# Patient Record
Sex: MALE | Race: WHITE | NOT HISPANIC OR LATINO | URBAN - METROPOLITAN AREA
[De-identification: names, ages, dates, MRNs, and addresses within clinical notes are randomized per-mention and may not be internally consistent; named-entity substitution may affect disease eponyms.]

---

## 2018-12-14 ENCOUNTER — INPATIENT (INPATIENT)
Facility: HOSPITAL | Age: 43
LOS: 5 days | Discharge: HOME | DRG: 872 | End: 2018-12-20
Attending: INTERNAL MEDICINE | Admitting: INTERNAL MEDICINE
Payer: MEDICAID

## 2018-12-14 VITALS
SYSTOLIC BLOOD PRESSURE: 156 MMHG | HEART RATE: 98 BPM | DIASTOLIC BLOOD PRESSURE: 92 MMHG | OXYGEN SATURATION: 97 % | RESPIRATION RATE: 16 BRPM | TEMPERATURE: 99 F

## 2018-12-14 DIAGNOSIS — G03.9 MENINGITIS, UNSPECIFIED: ICD-10-CM

## 2018-12-14 LAB
ALBUMIN SERPL ELPH-MCNC: 4.2 G/DL — SIGNIFICANT CHANGE UP (ref 3.3–5)
ALP SERPL-CCNC: 80 U/L — SIGNIFICANT CHANGE UP (ref 40–120)
ALT FLD-CCNC: 104 U/L — HIGH (ref 10–45)
ANION GAP SERPL CALC-SCNC: 14 MMOL/L — SIGNIFICANT CHANGE UP (ref 5–17)
APPEARANCE CSF: ABNORMAL
APPEARANCE SPUN FLD: COLORLESS — SIGNIFICANT CHANGE UP
APPEARANCE UR: CLEAR — SIGNIFICANT CHANGE UP
AST SERPL-CCNC: 51 U/L — HIGH (ref 10–40)
BACTERIA # UR AUTO: NEGATIVE — SIGNIFICANT CHANGE UP
BASE EXCESS BLDV CALC-SCNC: 1.5 MMOL/L — SIGNIFICANT CHANGE UP (ref -2–2)
BILIRUB SERPL-MCNC: 0.5 MG/DL — SIGNIFICANT CHANGE UP (ref 0.2–1.2)
BILIRUB UR-MCNC: NEGATIVE — SIGNIFICANT CHANGE UP
BUN SERPL-MCNC: 15 MG/DL — SIGNIFICANT CHANGE UP (ref 7–23)
CA-I SERPL-SCNC: 1.18 MMOL/L — SIGNIFICANT CHANGE UP (ref 1.12–1.3)
CALCIUM SERPL-MCNC: 9.1 MG/DL — SIGNIFICANT CHANGE UP (ref 8.4–10.5)
CHLORIDE BLDV-SCNC: 101 MMOL/L — SIGNIFICANT CHANGE UP (ref 96–108)
CHLORIDE SERPL-SCNC: 96 MMOL/L — SIGNIFICANT CHANGE UP (ref 96–108)
CO2 BLDV-SCNC: 26 MMOL/L — SIGNIFICANT CHANGE UP (ref 22–30)
CO2 SERPL-SCNC: 22 MMOL/L — SIGNIFICANT CHANGE UP (ref 22–31)
COLOR CSF: SIGNIFICANT CHANGE UP
COLOR SPEC: YELLOW — SIGNIFICANT CHANGE UP
CREAT SERPL-MCNC: 1.03 MG/DL — SIGNIFICANT CHANGE UP (ref 0.5–1.3)
DIFF PNL FLD: NEGATIVE — SIGNIFICANT CHANGE UP
EPI CELLS # UR: 1 /HPF — SIGNIFICANT CHANGE UP
GAS PNL BLDV: 131 MMOL/L — LOW (ref 136–145)
GAS PNL BLDV: SIGNIFICANT CHANGE UP
GAS PNL BLDV: SIGNIFICANT CHANGE UP
GLUCOSE BLDV-MCNC: 116 MG/DL — HIGH (ref 70–99)
GLUCOSE CSF-MCNC: 38 MG/DL — LOW (ref 40–70)
GLUCOSE SERPL-MCNC: 117 MG/DL — HIGH (ref 70–99)
GLUCOSE UR QL: NEGATIVE — SIGNIFICANT CHANGE UP
HCO3 BLDV-SCNC: 25 MMOL/L — SIGNIFICANT CHANGE UP (ref 21–29)
HCT VFR BLD CALC: 39.7 % — SIGNIFICANT CHANGE UP (ref 39–50)
HCT VFR BLDA CALC: 41 % — SIGNIFICANT CHANGE UP (ref 39–50)
HGB BLD CALC-MCNC: 13.4 G/DL — SIGNIFICANT CHANGE UP (ref 13–17)
HGB BLD-MCNC: 13.5 G/DL — SIGNIFICANT CHANGE UP (ref 13–17)
HYALINE CASTS # UR AUTO: 0 /LPF — SIGNIFICANT CHANGE UP (ref 0–2)
KETONES UR-MCNC: SIGNIFICANT CHANGE UP
LACTATE BLDV-MCNC: 1.5 MMOL/L — SIGNIFICANT CHANGE UP (ref 0.7–2)
LEUKOCYTE ESTERASE UR-ACNC: NEGATIVE — SIGNIFICANT CHANGE UP
LYMPHOCYTES # CSF: 58 % — SIGNIFICANT CHANGE UP (ref 40–80)
MCHC RBC-ENTMCNC: 29.1 PG — SIGNIFICANT CHANGE UP (ref 27–34)
MCHC RBC-ENTMCNC: 34 GM/DL — SIGNIFICANT CHANGE UP (ref 32–36)
MCV RBC AUTO: 85.6 FL — SIGNIFICANT CHANGE UP (ref 80–100)
MONOS+MACROS NFR CSF: 17 % — SIGNIFICANT CHANGE UP (ref 15–45)
NEUTROPHILS # CSF: 25 % — HIGH (ref 0–6)
NITRITE UR-MCNC: NEGATIVE — SIGNIFICANT CHANGE UP
NRBC NFR CSF: 1460 /UL — HIGH (ref 0–5)
OTHER CELLS CSF MANUAL: 12 ML/DL — LOW (ref 18–22)
PCO2 BLDV: 36 MMHG — SIGNIFICANT CHANGE UP (ref 35–50)
PH BLDV: 7.46 — HIGH (ref 7.35–7.45)
PH UR: 7 — SIGNIFICANT CHANGE UP (ref 5–8)
PLATELET # BLD AUTO: 548 K/UL — HIGH (ref 150–400)
PO2 BLDV: 34 MMHG — SIGNIFICANT CHANGE UP (ref 25–45)
POTASSIUM BLDV-SCNC: 4.2 MMOL/L — SIGNIFICANT CHANGE UP (ref 3.5–5.3)
POTASSIUM SERPL-MCNC: 4.4 MMOL/L — SIGNIFICANT CHANGE UP (ref 3.5–5.3)
POTASSIUM SERPL-SCNC: 4.4 MMOL/L — SIGNIFICANT CHANGE UP (ref 3.5–5.3)
PROT CSF-MCNC: 120 MG/DL — HIGH (ref 15–45)
PROT SERPL-MCNC: 7.7 G/DL — SIGNIFICANT CHANGE UP (ref 6–8.3)
PROT UR-MCNC: ABNORMAL
RAPID RVP RESULT: SIGNIFICANT CHANGE UP
RBC # BLD: 4.64 M/UL — SIGNIFICANT CHANGE UP (ref 4.2–5.8)
RBC # CSF: 2 /UL — HIGH (ref 0–0)
RBC # FLD: 13 % — SIGNIFICANT CHANGE UP (ref 10.3–14.5)
RBC CASTS # UR COMP ASSIST: 1 /HPF — SIGNIFICANT CHANGE UP (ref 0–4)
SAO2 % BLDV: 67 % — SIGNIFICANT CHANGE UP (ref 67–88)
SODIUM SERPL-SCNC: 132 MMOL/L — LOW (ref 135–145)
SP GR SPEC: 1.02 — SIGNIFICANT CHANGE UP (ref 1.01–1.02)
TUBE TYPE: SIGNIFICANT CHANGE UP
UROBILINOGEN FLD QL: NEGATIVE — SIGNIFICANT CHANGE UP
WBC # BLD: 12.2 K/UL — HIGH (ref 3.8–10.5)
WBC # FLD AUTO: 12.2 K/UL — HIGH (ref 3.8–10.5)
WBC UR QL: 1 /HPF — SIGNIFICANT CHANGE UP (ref 0–5)

## 2018-12-14 RX ORDER — LIDOCAINE/EPINEPHR/TETRACAINE 4-0.09-0.5
1 GEL WITH PREFILLED APPLICATOR (ML) TOPICAL ONCE
Qty: 0 | Refills: 0 | Status: COMPLETED | OUTPATIENT
Start: 2018-12-14 | End: 2018-12-14

## 2018-12-14 RX ORDER — CEFTRIAXONE 500 MG/1
2 INJECTION, POWDER, FOR SOLUTION INTRAMUSCULAR; INTRAVENOUS ONCE
Qty: 0 | Refills: 0 | Status: COMPLETED | OUTPATIENT
Start: 2018-12-14 | End: 2018-12-14

## 2018-12-14 RX ORDER — SODIUM CHLORIDE 9 MG/ML
1500 INJECTION INTRAMUSCULAR; INTRAVENOUS; SUBCUTANEOUS ONCE
Qty: 0 | Refills: 0 | Status: COMPLETED | OUTPATIENT
Start: 2018-12-14 | End: 2018-12-14

## 2018-12-14 RX ORDER — VANCOMYCIN HCL 1 G
1000 VIAL (EA) INTRAVENOUS ONCE
Qty: 0 | Refills: 0 | Status: COMPLETED | OUTPATIENT
Start: 2018-12-14 | End: 2018-12-14

## 2018-12-14 RX ORDER — ONDANSETRON 8 MG/1
4 TABLET, FILM COATED ORAL ONCE
Qty: 0 | Refills: 0 | Status: COMPLETED | OUTPATIENT
Start: 2018-12-14 | End: 2018-12-14

## 2018-12-14 RX ORDER — ACETAMINOPHEN 500 MG
650 TABLET ORAL ONCE
Qty: 0 | Refills: 0 | Status: COMPLETED | OUTPATIENT
Start: 2018-12-14 | End: 2018-12-14

## 2018-12-14 RX ADMIN — Medication 650 MILLIGRAM(S): at 18:25

## 2018-12-14 RX ADMIN — Medication 1 APPLICATION(S): at 20:30

## 2018-12-14 RX ADMIN — ONDANSETRON 4 MILLIGRAM(S): 8 TABLET, FILM COATED ORAL at 23:58

## 2018-12-14 RX ADMIN — Medication 250 MILLIGRAM(S): at 22:28

## 2018-12-14 RX ADMIN — SODIUM CHLORIDE 1500 MILLILITER(S): 9 INJECTION INTRAMUSCULAR; INTRAVENOUS; SUBCUTANEOUS at 20:30

## 2018-12-14 RX ADMIN — CEFTRIAXONE 100 GRAM(S): 500 INJECTION, POWDER, FOR SOLUTION INTRAMUSCULAR; INTRAVENOUS at 19:47

## 2018-12-14 RX ADMIN — CEFTRIAXONE 2 GRAM(S): 500 INJECTION, POWDER, FOR SOLUTION INTRAMUSCULAR; INTRAVENOUS at 20:29

## 2018-12-14 RX ADMIN — SODIUM CHLORIDE 1500 MILLILITER(S): 9 INJECTION INTRAMUSCULAR; INTRAVENOUS; SUBCUTANEOUS at 18:21

## 2018-12-14 NOTE — ED ADULT NURSE NOTE - OBJECTIVE STATEMENT
43 yr old male amb to ed, denies PMH. C/o intermittent fever with ha x 3 weeks. Pt seen at [facility] , had ct and lab work. D/C home. Pt c/o feeling fatigue with headache. Has been taking Tylenol/ Motrin with only brief relief. C/o nausea and vomit x2 today. Denies abd pain Denies chest pain or sob Denies diarrhea Denies burn or diff urinating. No nuchal rigidity or sensitivity to light.

## 2018-12-14 NOTE — ED PROVIDER NOTE - OBJECTIVE STATEMENT
Private Physician [facility] staff  43y male pmh neg, no dm,htn,hld,cancercva, employed as dental tech. Pt comes to ed complains of fever/on off past three weeks, Tmax 102. With headache. Seen at [facility]/  guzman nl head ct and labs dc home. Now comes to ed referred by pmd for persistnet fatigue, headache. Has been taking tylenol/motrin with only brieft/occasional relief. +NV twice today. Ha biltemproal. and face, No chest pain cough,gi bleeding,diarrhea, dysuria, Last tylenol approx noon. No recent travel,cancer

## 2018-12-14 NOTE — ED PROVIDER NOTE - MEDICAL DECISION MAKING DETAILS
Pt presents with ha and fuo past three weeks, Check ct head, labs ua. cultures, if neg lp,  Brandin Cruz MD, Facep

## 2018-12-14 NOTE — ED ADULT NURSE NOTE - NSIMPLEMENTINTERV_GEN_ALL_ED
Implemented All Universal Safety Interventions:  [place] to call system. Call bell, personal items and telephone within reach. Instruct patient to call for assistance. Room bathroom lighting operational. Non-slip footwear when patient is off stretcher. Physically safe environment: no spills, clutter or unnecessary equipment. Stretcher in lowest position, wheels locked, appropriate side rails in place.

## 2018-12-15 DIAGNOSIS — E87.1 HYPO-OSMOLALITY AND HYPONATREMIA: ICD-10-CM

## 2018-12-15 DIAGNOSIS — Z29.9 ENCOUNTER FOR PROPHYLACTIC MEASURES, UNSPECIFIED: ICD-10-CM

## 2018-12-15 DIAGNOSIS — G03.9 MENINGITIS, UNSPECIFIED: ICD-10-CM

## 2018-12-15 DIAGNOSIS — A41.9 SEPSIS, UNSPECIFIED ORGANISM: ICD-10-CM

## 2018-12-15 DIAGNOSIS — G00.9 BACTERIAL MENINGITIS, UNSPECIFIED: ICD-10-CM

## 2018-12-15 DIAGNOSIS — R43.2 PARAGEUSIA: ICD-10-CM

## 2018-12-15 LAB
ANION GAP SERPL CALC-SCNC: 12 MMOL/L — SIGNIFICANT CHANGE UP (ref 5–17)
BASOPHILS # BLD AUTO: 0.02 K/UL — SIGNIFICANT CHANGE UP (ref 0–0.2)
BASOPHILS NFR BLD AUTO: 0.2 % — SIGNIFICANT CHANGE UP (ref 0–2)
BUN SERPL-MCNC: 16 MG/DL — SIGNIFICANT CHANGE UP (ref 7–23)
CALCIUM SERPL-MCNC: 8.9 MG/DL — SIGNIFICANT CHANGE UP (ref 8.4–10.5)
CHLORIDE SERPL-SCNC: 95 MMOL/L — LOW (ref 96–108)
CO2 SERPL-SCNC: 24 MMOL/L — SIGNIFICANT CHANGE UP (ref 22–31)
CREAT SERPL-MCNC: 1.02 MG/DL — SIGNIFICANT CHANGE UP (ref 0.5–1.3)
CSF PCR RESULT: SIGNIFICANT CHANGE UP
CULTURE RESULTS: NO GROWTH — SIGNIFICANT CHANGE UP
EOSINOPHIL # BLD AUTO: 0 K/UL — SIGNIFICANT CHANGE UP (ref 0–0.5)
EOSINOPHIL NFR BLD AUTO: 0 % — SIGNIFICANT CHANGE UP (ref 0–6)
GLUCOSE SERPL-MCNC: 105 MG/DL — HIGH (ref 70–99)
GRAM STN FLD: SIGNIFICANT CHANGE UP
HBV CORE AB SER-ACNC: SIGNIFICANT CHANGE UP
HCT VFR BLD CALC: 37.7 % — LOW (ref 39–50)
HCV AB S/CO SERPL IA: 0.09 S/CO — SIGNIFICANT CHANGE UP
HCV AB SERPL-IMP: SIGNIFICANT CHANGE UP
HGB BLD-MCNC: 12.5 G/DL — LOW (ref 13–17)
HIV 1+2 AB+HIV1 P24 AG SERPL QL IA: SIGNIFICANT CHANGE UP
IMM GRANULOCYTES NFR BLD AUTO: 0.2 % — SIGNIFICANT CHANGE UP (ref 0–1.5)
LYMPHOCYTES # BLD AUTO: 3.55 K/UL — HIGH (ref 1–3.3)
LYMPHOCYTES # BLD AUTO: 34.5 % — SIGNIFICANT CHANGE UP (ref 13–44)
MAGNESIUM SERPL-MCNC: 2.1 MG/DL — SIGNIFICANT CHANGE UP (ref 1.6–2.6)
MCHC RBC-ENTMCNC: 28.4 PG — SIGNIFICANT CHANGE UP (ref 27–34)
MCHC RBC-ENTMCNC: 33.2 GM/DL — SIGNIFICANT CHANGE UP (ref 32–36)
MCV RBC AUTO: 85.7 FL — SIGNIFICANT CHANGE UP (ref 80–100)
MONOCYTES # BLD AUTO: 1.09 K/UL — HIGH (ref 0–0.9)
MONOCYTES NFR BLD AUTO: 10.6 % — SIGNIFICANT CHANGE UP (ref 2–14)
NEUTROPHILS # BLD AUTO: 5.6 K/UL — SIGNIFICANT CHANGE UP (ref 1.8–7.4)
NEUTROPHILS NFR BLD AUTO: 54.5 % — SIGNIFICANT CHANGE UP (ref 43–77)
PHOSPHATE SERPL-MCNC: 2.9 MG/DL — SIGNIFICANT CHANGE UP (ref 2.5–4.5)
PLATELET # BLD AUTO: 456 K/UL — HIGH (ref 150–400)
POTASSIUM SERPL-MCNC: 4.5 MMOL/L — SIGNIFICANT CHANGE UP (ref 3.5–5.3)
POTASSIUM SERPL-SCNC: 4.5 MMOL/L — SIGNIFICANT CHANGE UP (ref 3.5–5.3)
RBC # BLD: 4.4 M/UL — SIGNIFICANT CHANGE UP (ref 4.2–5.8)
RBC # FLD: 13.7 % — SIGNIFICANT CHANGE UP (ref 10.3–14.5)
SODIUM SERPL-SCNC: 131 MMOL/L — LOW (ref 135–145)
SODIUM UR-SCNC: 135 MMOL/L — SIGNIFICANT CHANGE UP
SPECIMEN SOURCE: SIGNIFICANT CHANGE UP
SPECIMEN SOURCE: SIGNIFICANT CHANGE UP
WBC # BLD: 10.28 K/UL — SIGNIFICANT CHANGE UP (ref 3.8–10.5)
WBC # FLD AUTO: 10.28 K/UL — SIGNIFICANT CHANGE UP (ref 3.8–10.5)

## 2018-12-15 RX ORDER — ACETAMINOPHEN 500 MG
650 TABLET ORAL EVERY 6 HOURS
Qty: 0 | Refills: 0 | Status: DISCONTINUED | OUTPATIENT
Start: 2018-12-15 | End: 2018-12-20

## 2018-12-15 RX ORDER — VANCOMYCIN HCL 1 G
1000 VIAL (EA) INTRAVENOUS EVERY 8 HOURS
Qty: 0 | Refills: 0 | Status: DISCONTINUED | OUTPATIENT
Start: 2018-12-15 | End: 2018-12-18

## 2018-12-15 RX ORDER — ACETAMINOPHEN 500 MG
650 TABLET ORAL ONCE
Qty: 0 | Refills: 0 | Status: COMPLETED | OUTPATIENT
Start: 2018-12-15 | End: 2018-12-15

## 2018-12-15 RX ORDER — SODIUM CHLORIDE 9 MG/ML
1000 INJECTION INTRAMUSCULAR; INTRAVENOUS; SUBCUTANEOUS
Qty: 0 | Refills: 0 | Status: DISCONTINUED | OUTPATIENT
Start: 2018-12-15 | End: 2018-12-16

## 2018-12-15 RX ORDER — KETOROLAC TROMETHAMINE 30 MG/ML
15 SYRINGE (ML) INJECTION ONCE
Qty: 0 | Refills: 0 | Status: DISCONTINUED | OUTPATIENT
Start: 2018-12-15 | End: 2018-12-15

## 2018-12-15 RX ORDER — CEFTRIAXONE 500 MG/1
2 INJECTION, POWDER, FOR SOLUTION INTRAMUSCULAR; INTRAVENOUS EVERY 12 HOURS
Qty: 0 | Refills: 0 | Status: DISCONTINUED | OUTPATIENT
Start: 2018-12-15 | End: 2018-12-18

## 2018-12-15 RX ORDER — CEFTRIAXONE 500 MG/1
2 INJECTION, POWDER, FOR SOLUTION INTRAMUSCULAR; INTRAVENOUS EVERY 12 HOURS
Qty: 0 | Refills: 0 | Status: DISCONTINUED | OUTPATIENT
Start: 2018-12-15 | End: 2018-12-15

## 2018-12-15 RX ORDER — VANCOMYCIN HCL 1 G
1500 VIAL (EA) INTRAVENOUS EVERY 12 HOURS
Qty: 0 | Refills: 0 | Status: DISCONTINUED | OUTPATIENT
Start: 2018-12-15 | End: 2018-12-15

## 2018-12-15 RX ADMIN — Medication 650 MILLIGRAM(S): at 17:53

## 2018-12-15 RX ADMIN — CEFTRIAXONE 100 GRAM(S): 500 INJECTION, POWDER, FOR SOLUTION INTRAMUSCULAR; INTRAVENOUS at 06:34

## 2018-12-15 RX ADMIN — Medication 650 MILLIGRAM(S): at 00:19

## 2018-12-15 RX ADMIN — Medication 15 MILLIGRAM(S): at 12:44

## 2018-12-15 RX ADMIN — Medication 650 MILLIGRAM(S): at 12:44

## 2018-12-15 RX ADMIN — Medication 15 MILLIGRAM(S): at 23:00

## 2018-12-15 RX ADMIN — Medication 15 MILLIGRAM(S): at 22:29

## 2018-12-15 RX ADMIN — CEFTRIAXONE 100 GRAM(S): 500 INJECTION, POWDER, FOR SOLUTION INTRAMUSCULAR; INTRAVENOUS at 21:04

## 2018-12-15 RX ADMIN — Medication 650 MILLIGRAM(S): at 19:37

## 2018-12-15 RX ADMIN — SODIUM CHLORIDE 100 MILLILITER(S): 9 INJECTION INTRAMUSCULAR; INTRAVENOUS; SUBCUTANEOUS at 12:00

## 2018-12-15 RX ADMIN — Medication 650 MILLIGRAM(S): at 07:15

## 2018-12-15 RX ADMIN — Medication 250 MILLIGRAM(S): at 17:49

## 2018-12-15 RX ADMIN — Medication 300 MILLIGRAM(S): at 10:36

## 2018-12-15 RX ADMIN — Medication 650 MILLIGRAM(S): at 13:20

## 2018-12-15 RX ADMIN — SODIUM CHLORIDE 100 MILLILITER(S): 9 INJECTION INTRAMUSCULAR; INTRAVENOUS; SUBCUTANEOUS at 02:43

## 2018-12-15 RX ADMIN — Medication 650 MILLIGRAM(S): at 06:29

## 2018-12-15 RX ADMIN — Medication 650 MILLIGRAM(S): at 01:24

## 2018-12-15 NOTE — PROGRESS NOTE ADULT - PROBLEM SELECTOR PLAN 2
- leukocytosis (12.2), febrile to 103, HR >90, lactate wnl.  - likely source meningitis, CT chest negative, RVP negative, UA negative   - LP done in ED, CSF with glucose 98, Protein 120, Total Nucleated Cell 1460 with segmented neutrophils (25)  - BCx, UCx sent, s/p 1500cc IVF, c/w maintenance IVF   - will continue with vancomycin and ceftriaxone pending final cultures

## 2018-12-15 NOTE — H&P ADULT - NSHPPHYSICALEXAM_GEN_ALL_CORE
General: WN/WD NAD  Neurology: A&Ox3, nonfocal, CASILLAS x 4  Eyes: PERRLA/ EOMI, Gross vision intact  ENT: Gross hearing intact   Neck: Neck supple, trachea midline, No JVD  Respiratory: CTA B/L, No wheezing, rales, rhonchi  CV: RRR, S1S2, no murmurs, rubs or gallops  Abdominal: Soft, NT, ND +BS,   Extremities: No edema, + peripheral pulses  Skin: No Rashes, Hematoma, Ecchymosis Vital Signs Last 24 Hrs  T(C): 39.1 (15 Dec 2018 00:00), Max: 39.7 (14 Dec 2018 18:20)  T(F): 102.4 (15 Dec 2018 00:00), Max: 103.4 (14 Dec 2018 18:20)  HR: 80 (15 Dec 2018 00:00) (79 - 98)  BP: 135/61 (15 Dec 2018 00:00) (121/77 - 164/83)  RR: 16 (15 Dec 2018 00:00) (16 - 18)  SpO2: 95% (15 Dec 2018 00:00) (95% - 97%)    General: uncomfortable due to headache   Neurology: A&Ox3, nonfocal, CASILLAS x 4  Eyes: PERRLA/EOMI, Gross vision intact  ENT: Gross hearing intact   Neck: Neck supple, trachea midline, No JVD  Respiratory: CTA B/L, No wheezing, rales, rhonchi  CV: RRR, S1S2, no murmurs, rubs or gallops  Abdominal: Soft, NT, ND +BS,   Extremities: No edema, + peripheral pulses  Skin: No Rashes, Hematoma, Ecchymosis Vital Signs Last 24 Hrs  T(C): 39.1 (15 Dec 2018 00:00), Max: 39.7 (14 Dec 2018 18:20)  T(F): 102.4 (15 Dec 2018 00:00), Max: 103.4 (14 Dec 2018 18:20)  HR: 80 (15 Dec 2018 00:00) (79 - 98)  BP: 135/61 (15 Dec 2018 00:00) (121/77 - 164/83)  RR: 16 (15 Dec 2018 00:00) (16 - 18)  SpO2: 95% (15 Dec 2018 00:00) (95% - 97%)    General: uncomfortable due to headache, NAD, NC/AT  Neurology: A&Ox3, nonfocal, CASILLAS x 4  Eyes: PERRLA/EOMI, Gross vision intact  ENT: Gross hearing intact, MMM, nl dentition  Neck: Neck supple, trachea midline, No JVD  Respiratory: CTA B/L, No wheezing, rales, rhonchi  CV: RRR, S1S2, no murmurs, rubs or gallops  Abdominal: Soft, NT, ND +BS,   Extremities: No edema, + peripheral pulses  Skin: No Rashes, Hematoma, Ecchymosis

## 2018-12-15 NOTE — CONSULT NOTE ADULT - SUBJECTIVE AND OBJECTIVE BOX
HPI:  43M previously healthy was admitted 18 for meningitis after three weeks of headaches and fevers.   No inciting events, no sick contacts, lives at home with his wife and kids who are healthy, no travel recently, last trip was to his home country of [place] in the summer for one week.   The headaches was variable in quality and severity with associated neck discomfort and light and sound sensitivity. He reports going to [facility] last week with a negative CT head and blood work but refused an LP. Due to persistence of symptoms he saw his PMD on  who told him to come to the hospital.   CSF with elevated leukocyte 1460, slight neutrophil predominance, elevated protein 120, glucose slightly low 38. Gram stain negative and PCR negative. Had not received any antibiotics for these symptoms.       PAST MEDICAL & SURGICAL HISTORY:  No significant past medical history  No significant past surgical history      Allergies    No Known Allergies    Intolerances        ANTIMICROBIALS:  cefTRIAXone   IVPB 2 every 12 hours  vancomycin  IVPB 1500 every 12 hours      OTHER MEDS:  acetaminophen   Tablet .. 650 milliGRAM(s) Oral every 6 hours PRN  sodium chloride 0.9%. 1000 milliLiter(s) IV Continuous <Continuous>    SOCIAL HISTORY: Born in [place]. Employed. Lives with his wife and children.     FAMILY HISTORY:  Negative, No pertinent family history in relation to chief complaint     ROS:  Unobtainable because:   All other systems negative   Constitutional: no fever, no chills, no weight loss, no night sweats  HEENT:  no vision changes, no sore throat, no rhinorrhea  Cardiovascular:  no chest pain, no palpitation  Respiratory:  no SOB, no cough  GI:  no abd pain, no vomiting, no diarrhea  urinary: no dysuria, no hematuria, no flank pain  musculoskeletal:  no joint pain, no joint swelling  skin:  no rash  neurology:  no headache, no seizure, no change in mental status  heme: no bleeding    Physical Exam:  General:    NAD, non toxic, A&O x3  HEENT:   no oropharyngeal lesions, no LAD, neck supple  Cardiovascular:    regular rate and rhythm   Respiratory: nonlabored on room air, clear bilaterally, no wheezing  abd:   soft, bowel sounds present, not tender, no hepatosplenomegaly  :     no CVAT, no spurapubic tenderness, no cronin  Musculoskeletal : no joint swelling  Skin:    no rash  Neurologic:     no focal deficits  Vascular: no edema, no phlebitis   Psych: normal affect    Drug Dosing Weight  Height (cm): 187.96 (15 Dec 2018 01:50)  Weight (kg): 102.3 (15 Dec 2018 00:43)  BMI (kg/m2): 29 (15 Dec 2018 01:50)  BSA (m2): 2.29 (15 Dec 2018 01:50)    Vital Signs Last 24 Hrs  T(F): 99.6 (12-15-18 @ 09:14), Max: 103.4 (18 @ 18:20)    Vital Signs Last 24 Hrs  HR: 72 (12-15-18 @ 09:14) (71 - 98)  BP: 125/72 (12-15-18 @ 09:14) (112/65 - 164/83)  RR: 18 (12-15-18 @ 09:14)  SpO2: 95% (12-15-18 @ 09:14) (92% - 97%)  Wt(kg): --                          12.5   10.28 )-----------( 456      ( 15 Dec 2018 09:18 )             37.7       12-15    131<L>  |  95<L>  |  16  ----------------------------<  105<H>  4.5   |  24  |  1.02    Ca    8.9      15 Dec 2018 07:07  Phos  2.9     12-15  Mg     2.1     12-15    TPro  7.7  /  Alb  4.2  /  TBili  0.5  /  DBili  x   /  AST  51<H>  /  ALT  104<H>  /  AlkPhos  80        Urinalysis Basic - ( 14 Dec 2018 19:55 )    Color: Yellow / Appearance: Clear / S.024 / pH: x  Gluc: x / Ketone: Trace  / Bili: Negative / Urobili: Negative   Blood: x / Protein: Trace / Nitrite: Negative   Leuk Esterase: Negative / RBC: 1 /hpf / WBC 1 /hpf   Sq Epi: x / Non Sq Epi: 1 /hpf / Bacteria: Negative        MICROBIOLOGY:  v  .CSF CSF  12-15-18 --  --    polymorphonuclear leukocytes seen  No organisms seen  by cytocentrifuge          Rapid RVP Result: NotDetec ( @ 18:41)          RADIOLOGY: HPI:  43M previously healthy was admitted 18 for meningitis after three weeks of headaches and fevers.   No inciting events, no sick contacts, lives at home with his wife and kids who are healthy, no travel recently, last trip was to his home country of [place]"" in the summer for one week.   The headaches was variable in quality and severity with associated neck discomfort and light and sound sensitivity. He reports going to [facility] last week with a negative CT head and blood work but refused an LP. Due to persistence of symptoms he saw his PMD on  who told him to come to the hospital.   CSF with elevated leukocyte 1460, slight neutrophil predominance, elevated protein 120, glucose slightly low 38. Gram stain negative and PCR negative. Had not received any antibiotics for these symptoms.       PAST MEDICAL & SURGICAL HISTORY:  No significant past medical history  No significant past surgical history      Allergies    No Known Allergies    Intolerances        ANTIMICROBIALS:  cefTRIAXone   IVPB 2 every 12 hours  vancomycin  IVPB 1500 every 12 hours      OTHER MEDS:  acetaminophen   Tablet .. 650 milliGRAM(s) Oral every 6 hours PRN  sodium chloride 0.9%. 1000 milliLiter(s) IV Continuous <Continuous>    SOCIAL HISTORY: Born in \[place]". Employed. Lives with his wife and children.     FAMILY HISTORY:  Negative, No pertinent family history in relation to chief complaint     ROS:  All other systems negative   Constitutional: fever, chills, no appetite. malaise   HEENT:  no vision changes, no sore throat, no rhinorrhea  Cardiovascular:  no chest pain, no palpitation  Respiratory:  no SOB, no cough  GI:  no abd pain, no vomiting, no diarrhea  urinary: no dysuria, no hematuria, no flank pain  musculoskeletal:  no joint pain, no joint swelling  skin:  no rash  neurology:  headache, no change in mental status  heme: no bleeding    Physical Exam:  General:    uncomfortable, fatigued, A&O x3  HEENT:   no oropharyngeal lesions, no LAD, neck supple but uncomfortable on flexion  Cardiovascular:    regular rate and rhythm   Respiratory: nonlabored on room air, clear bilaterally, no wheezing  abd:   soft, bowel sounds present, not tender, no hepatosplenomegaly  :     no CVAT, no spurapubic tenderness, no cronin  Musculoskeletal : no joint swelling  Skin:    no rash  Neurologic:     no focal deficits  Vascular: no edema, no phlebitis   Psych: normal affect    Drug Dosing Weight  Height (cm): 187.96 (15 Dec 2018 01:50)  Weight (kg): 102.3 (15 Dec 2018 00:43)  BMI (kg/m2): 29 (15 Dec 2018 01:50)  BSA (m2): 2.29 (15 Dec 2018 01:50)    Vital Signs Last 24 Hrs  T(F): 99.6 (12-15-18 @ 09:14), Max: 103.4 (18 @ 18:20)    Vital Signs Last 24 Hrs  HR: 72 (12-15-18 @ 09:14) (71 - 98)  BP: 125/72 (12-15-18 @ 09:14) (112/65 - 164/83)  RR: 18 (12-15-18 @ 09:14)  SpO2: 95% (12-15-18 @ 09:14) (92% - 97%)  Wt(kg): --                          12.5   10.28 )-----------( 456      ( 15 Dec 2018 09:18 )             37.7       12-15    131<L>  |  95<L>  |  16  ----------------------------<  105<H>  4.5   |  24  |  1.02    Ca    8.9      15 Dec 2018 07:07  Phos  2.9     12-15  Mg     2.1     12-15    TPro  7.7  /  Alb  4.2  /  TBili  0.5  /  DBili  x   /  AST  51<H>  /  ALT  104<H>  /  AlkPhos  80  12-14      Urinalysis Basic - ( 14 Dec 2018 19:55 )    Color: Yellow / Appearance: Clear / S.024 / pH: x  Gluc: x / Ketone: Trace  / Bili: Negative / Urobili: Negative   Blood: x / Protein: Trace / Nitrite: Negative   Leuk Esterase: Negative / RBC: 1 /hpf / WBC 1 /hpf   Sq Epi: x / Non Sq Epi: 1 /hpf / Bacteria: Negative        MICROBIOLOGY:  v  .CSF CSF  12-15-18 --  --    polymorphonuclear leukocytes seen  No organisms seen  by cytocentrifuge          Rapid RVP Result: NotDetec ( @ 18:41)          RADIOLOGY:

## 2018-12-15 NOTE — H&P ADULT - HISTORY OF PRESENT ILLNESS
43M with no significant PMHx who presents with fevers and headaches of 2 week duration. Pt. has been having persistent fevers at home with Tmax of 102F associated with headache and neck stiffness. Headache is bilateral, and has been constant over the past two weeks, minimal relief with tylenol. He went to [facility] on 12/4 with these complaints, CTH negative, was recommended to get LP, however refused and went home. He continued to have fatigue and headaches, so was suggested by his PMD to come to the ED today. Hhe denies  cp, sob, n/v/d, dysuria, rashes, sick contacts, recent travel.     In the ED - VS Tm 103.4, HR 98, /92, RR 16, SPO2 97% on RA  Labs notable for leukocytosis (WBC 12.2)    LP done in ED, CSF with glucose 98, Protein 120, Total Nucleated Cell 1460 with segmented neutrophils (25)    CTH and chest negative     Received, vancomycin 1g, ceftriaxone 2g, 1500cc NS bolus, acetaminophen x2, ondansetron 4mg x1 43M with no significant PMHx who presents with fevers and headaches of 2 week duration. Pt. has been having persistent fevers at home with Tmax of 102F associated with headache and neck stiffness. Headache is bilateral, and has been constant over the past two weeks, minimal relief with tylenol. He went to [facility] on 12/4 with these complaints, CTH negative, was recommended to get LP, however refused and went home. He continued to have fatigue and headaches, so was suggested by his PMD to come to the ED today. Hhe denies  cp, sob, n/v/d, dysuria, rashes, sick contacts, recent travel.     In the ED - VS Tm 103.4, HR 98, /92, RR 16, SPO2 97% on RA  Labs notable for leukocytosis (WBC 12.2)  LP done in ED, CSF with glucose 98, Protein 120, Total Nucleated Cell 1460 with segmented neutrophils (25)  CTH and chest negative     Received, vancomycin 1g, ceftriaxone 2g, 1500cc NS bolus, acetaminophen x2, ondansetron 4mg x1 43M with no significant PMHx who presents with fevers and headaches of 2 week duration. Pt. has been having persistent fevers at home with Tmax of 102F associated with headache and neck stiffness. Headache is bilateral, and has been constant over the past two weeks, minimal relief with tylenol. He went to [facility] on 12/4 with these complaints, CTH negative, was recommended to get LP, however refused and went home. He continued to have fatigue and headaches, so was suggested by his PMD to come to the ED today. He denies hx of latent tb, cp, sob, n/v/d, dysuria, rashes, sick contacts, recent travel.     In the ED - VS Tm 103.4, HR 98, /92, RR 16, SPO2 97% on RA  Labs notable for leukocytosis (WBC 12.2)  LP done in ED, CSF with glucose 98, Protein 120, Total Nucleated Cell 1460 with segmented neutrophils (25)  CTH and chest negative     Received, vancomycin 1g, ceftriaxone 2g, 1500cc NS bolus, acetaminophen x2, ondansetron 4mg x1 43M with no significant PMHx who presents with fevers and headaches of 2 week duration. Pt. has been having persistent fevers at home with Tmax of 102F associated with headache and neck stiffness. Headache is bilateral, and has been constant over the past two weeks, minimal relief with tylenol. He went to [facility] on 12/4 with these complaints, CTH negative, was recommended to get LP, however refused and went home. He continued to have fatigue and headaches, so was suggested by his PMD to come to the ED today. He denies hx of latent tb, cp, sob, n/v/d, dysuria, rashes, sick contacts, recent travel.  [place] moved [place] from [place] at 23 yo and visits frequently last this summer.  No h/o drug use/group home.  No h/o frequent infections/pna/past meningitis.  Pt states 3 yrs ago he had an "illness" and lost his sense of taste and smell - both slowly returning but things will smell different than they used to, has had neg work up by MD.    In the ED - VS Tm 103.4, HR 98, /92, RR 16, SPO2 97% on RA  Labs notable for leukocytosis (WBC 12.2)  LP done in ED, CSF with glucose 98, Protein 120, Total Nucleated Cell 1460 with segmented neutrophils (25)  CTH and chest negative     Received, vancomycin 1g, ceftriaxone 2g, 1500cc NS bolus, acetaminophen x2, ondansetron 4mg x1

## 2018-12-15 NOTE — CONSULT NOTE ADULT - ASSESSMENT
Not sure etiology. Studies suggest bacterial although not convincingly. Would not expect false negative PCR with this high of pleocytosis and without prior antibiotics although sensitivity for all pathogens not clear.   Canc ontinue empiric Vanc Ceftriaxone   No risk for Listeria   HIV screen- patient agrees   repeat PCR?   f/u cultures   Acyclovir? no encephalitis Not sure etiology. Studies suggest bacterial although not convincingly. Would not expect false negative PCR with this high of pleocytosis and without prior antibiotics although sensitivity for all pathogens not clear.   Canc ontinue empiric Vanc Ceftriaxone   Vanc 1 q8h probably better dosing   No risk for Listeria   HIV screen- patient agrees   repeat PCR?   f/u cultures   Acyclovir? no encephalitis 43M previously healthy admitted 12/14/18 with meningitis, symptoms for three weeks.   Unclear etiology. CSF studies favor bacterial but not convincingly. PCR sensitivity for HSV and Meningococcal are high 96-98%, for other pathogens not clear but with this high of pleocytosis and without prior antibiotics would expect it to have picked up.     Recommend  -can continue empiric Vancomycin (1GM IV q8h) and Ceftriaxone   -droplet precautions can stop after 24hrs of receiving Ceftriaxone   -HIV screen- patient agrees   -HIV viral load   -LDH   -Quantiferon gold   -repeat LP in a few days if not improved for flow cytometry, large volume AFB culture and cell count, protein, glucose     attempted to call back primary team

## 2018-12-15 NOTE — PROGRESS NOTE ADULT - SUBJECTIVE AND OBJECTIVE BOX
Froylan Patricia, PGY1  Pager: [phone]  After 7: Night Float pager  Alternate contact:     Patient is a 43y old  Male who presents with a chief complaint of meningitis (15 Dec 2018 00:11)      SUBJECTIVE / OVERNIGHT EVENTS:    MEDICATIONS  (STANDING):  cefTRIAXone   IVPB 2 Gram(s) IV Intermittent every 12 hours  sodium chloride 0.9%. 1000 milliLiter(s) (100 mL/Hr) IV Continuous <Continuous>  vancomycin  IVPB 1500 milliGRAM(s) IV Intermittent every 12 hours    MEDICATIONS  (PRN):  acetaminophen   Tablet .. 650 milliGRAM(s) Oral every 6 hours PRN Temp greater or equal to 38C (100.4F)      Vital Signs Last 24 Hrs  T(C): 39.4 (15 Dec 2018 06:40), Max: 39.7 (14 Dec 2018 18:20)  T(F): 102.9 (15 Dec 2018 06:40), Max: 103.4 (14 Dec 2018 18:20)  HR: 76 (15 Dec 2018 06:40) (71 - 98)  BP: 121/73 (15 Dec 2018 06:40) (112/65 - 164/83)  BP(mean): --  RR: 20 (15 Dec 2018 06:40) (16 - 20)  SpO2: 94% (15 Dec 2018 06:40) (92% - 97%)  CAPILLARY BLOOD GLUCOSE        I&O's Summary    14 Dec 2018 07:01  -  15 Dec 2018 07:00  --------------------------------------------------------  IN: 550 mL / OUT: 0 mL / NET: 550 mL        PHYSICAL EXAM:  GENERAL: NAD, well-developed  EYES: EOMI, PERRLA, conjunctiva and sclera clear  NECK:  No JVD  CHEST/LUNG: CTABL ; No wheeze  HEART: RRR; No murmurs  ABDOMEN: Soft, Nontender, Nondistended; Bowel sounds present  EXTREMITIES:  2+ Peripheral Pulses, No edema  MUSCULOSKEL:   PSYCH: AAOx   NEUROLOGY: CN 2-12 grossly intact, strength, sensory,   SKIN: No rashes or lesions. No sacral ulcer    LABS:                        13.5   12.2  )-----------( 548      ( 14 Dec 2018 18:41 )             39.7     12-    132<L>  |  96  |  15  ----------------------------<  117<H>  4.4   |  22  |  1.03    Ca    9.1      14 Dec 2018 18:41    TPro  7.7  /  Alb  4.2  /  TBili  0.5  /  DBili  x   /  AST  51<H>  /  ALT  104<H>  /  AlkPhos  80            Urinalysis Basic - ( 14 Dec 2018 19:55 )    Color: Yellow / Appearance: Clear / S.024 / pH: x  Gluc: x / Ketone: Trace  / Bili: Negative / Urobili: Negative   Blood: x / Protein: Trace / Nitrite: Negative   Leuk Esterase: Negative / RBC: 1 /hpf / WBC 1 /hpf   Sq Epi: x / Non Sq Epi: 1 /hpf / Bacteria: Negative        Microbiology;    Culture - CSF with Gram Stain (collected 12-15-18 @ 00:20)  Source: .CSF CSF  Gram Stain (12-15-18 @ 01:31):    polymorphonuclear leukocytes seen    No organisms seen    by cytocentrifuge        RADIOLOGY & ADDITIONAL TESTS:    Imaging Personally Reviewed:    Consultant(s) Notes Reviewed: Froylan Patricia, PGY1  Pager: [phone]  After 7: Night Float pager  Alternate contact:     Patient is a 43y old  Male who presents with a chief complaint of meningitis (15 Dec 2018 00:11)    SUBJECTIVE / OVERNIGHT EVENTS: Spiked fever of 102.9F in AM, fever resolved w/ Tylenol.  Endorses fever, sweat, stiff neck, dull headache, photophobia. He is mentating well, A&Ox3, denies SOB, chest pain, palpitation, n/v, and GI changes.     MEDICATIONS  (STANDING):  cefTRIAXone   IVPB 2 Gram(s) IV Intermittent every 12 hours  sodium chloride 0.9%. 1000 milliLiter(s) (100 mL/Hr) IV Continuous <Continuous>  vancomycin  IVPB 1500 milliGRAM(s) IV Intermittent every 12 hours    MEDICATIONS  (PRN):  acetaminophen   Tablet .. 650 milliGRAM(s) Oral every 6 hours PRN Temp greater or equal to 38C (100.4F)      Vital Signs Last 24 Hrs  T(C): 39.4 (15 Dec 2018 06:40), Max: 39.7 (14 Dec 2018 18:20)  T(F): 102.9 (15 Dec 2018 06:40), Max: 103.4 (14 Dec 2018 18:20)  HR: 76 (15 Dec 2018 06:40) (71 - 98)  BP: 121/73 (15 Dec 2018 06:40) (112/65 - 164/83)  BP(mean): --  RR: 20 (15 Dec 2018 06:40) (16 - 20)  SpO2: 94% (15 Dec 2018 06:40) (92% - 97%)  CAPILLARY BLOOD GLUCOSE    I&O's Summary    14 Dec 2018 07:01  -  15 Dec 2018 07:00  --------------------------------------------------------  IN: 550 mL / OUT: 0 mL / NET: 550 mL      PHYSICAL EXAM:  General: uncomfortable due to headache, NAD, NC/AT  Neurology: A&Ox3, nonfocal, CASILLAS x 4  Eyes: PERRLA/EOMI, Gross vision intact  ENT: Gross hearing intact, MMM, nl dentition  Neck: Neck supple, trachea midline, No JVD  Respiratory: CTA B/L, No wheezing, rales, rhonchi  CV: RRR, S1S2, no murmurs, rubs or gallops  Abdominal: Soft, NT, ND +BS,   Extremities: No edema, + peripheral pulses  Skin: No Rashes, Hematoma, Ecchymosis    LABS:                        13.5   12.2  )-----------( 548      ( 14 Dec 2018 18:41 )             39.7     1214    132<L>  |  96  |  15  ----------------------------<  117<H>  4.4   |  22  |  1.03    Ca    9.1      14 Dec 2018 18:41    TPro  7.7  /  Alb  4.2  /  TBili  0.5  /  DBili  x   /  AST  51<H>  /  ALT  104<H>  /  AlkPhos  80  12-      Urinalysis Basic - ( 14 Dec 2018 19:55 )    Color: Yellow / Appearance: Clear / S.024 / pH: x  Gluc: x / Ketone: Trace  / Bili: Negative / Urobili: Negative   Blood: x / Protein: Trace / Nitrite: Negative   Leuk Esterase: Negative / RBC: 1 /hpf / WBC 1 /hpf   Sq Epi: x / Non Sq Epi: 1 /hpf / Bacteria: Negative      Microbiology;    Culture - CSF with Gram Stain (collected 12-15-18 @ 00:20)  Source: .CSF CSF  Gram Stain (12-15-18 @ 01:31):    polymorphonuclear leukocytes seen    No organisms seen    by cytocentrifuge

## 2018-12-15 NOTE — H&P ADULT - NSHPLABSRESULTS_GEN_ALL_CORE
13.5   12.2  )-----------( 548      ( 14 Dec 2018 18:41 )             39.7     12-14    132<L>  |  96  |  15  ----------------------------<  117<H>  4.4   |  22  |  1.03    Ca    9.1      14 Dec 2018 18:41    TPro  7.7  /  Alb  4.2  /  TBili  0.5  /  DBili  x   /  AST  51<H>  /  ALT  104<H>  /  AlkPhos  80  12-14    EKG - 13.5   12.2  )-----------( 548      ( 14 Dec 2018 18:41 )             39.7     12-14    132<L>  |  96  |  15  ----------------------------<  117<H>  4.4   |  22  |  1.03    Ca    9.1      14 Dec 2018 18:41    TPro  7.7  /  Alb  4.2  /  TBili  0.5  /  DBili  x   /  AST  51<H>  /  ALT  104<H>  /  AlkPhos  80  12-14    EKG - not in chart, will order    < from: CT Chest No Cont (12.14.18 @ 19:42) >    IMPRESSION: Bilateral subsegmental atelectasis. No pneumonia.    < end of copied text >    < from: CT Head No Cont (12.14.18 @ 18:51) >    IMPRESSION:    Normal non-contrast CT of the brain.    < end of copied text > 13.5   12.2  )-----------( 548      ( 14 Dec 2018 18:41 )             39.7     12-14    132<L>  |  96  |  15  ----------------------------<  117<H>  4.4   |  22  |  1.03    Ca    9.1      14 Dec 2018 18:41    TPro  7.7  /  Alb  4.2  /  TBili  0.5  /  DBili  x   /  AST  51<H>  /  ALT  104<H>  /  AlkPhos  80  12-14    EKG - not in chart, will order    < from: CT Chest No Cont (12.14.18 @ 19:42) >    IMPRESSION: Bilateral subsegmental atelectasis. No pneumonia.    < end of copied text >    < from: CT Head No Cont (12.14.18 @ 18:51) >    IMPRESSION:    Normal non-contrast CT of the brain.    < end of copied text >  labs/studied reviewed personally by me

## 2018-12-15 NOTE — H&P ADULT - PROBLEM SELECTOR PLAN 2
- leukocytosis (13), febrile to 103, HR >90, lactate wnl.  - likely source bacterial meningitis, CT chest negative, RVP negative, UA negative   - LP done in ED, CSF with glucose 98, Protein 120, Total Nucleated Cell 1460 with segmented neutrophils (25)  - BCx, UCx sent   - will continue with vancomycin and ceftriaxone pending final cultures - leukocytosis (12.2), febrile to 103, HR >90, lactate wnl.  - likely source bacterial meningitis, CT chest negative, RVP negative, UA negative   - LP done in ED, CSF with glucose 98, Protein 120, Total Nucleated Cell 1460 with segmented neutrophils (25)  - BCx, UCx sent, s/p 1500cc IVF, c/w maintenance IVF   - will continue with vancomycin and ceftriaxone pending final cultures - leukocytosis (12.2), febrile to 103, HR >90, lactate wnl.  - likely source meningitis, CT chest negative, RVP negative, UA negative   - LP done in ED, CSF with glucose 98, Protein 120, Total Nucleated Cell 1460 with segmented neutrophils (25)  - BCx, UCx sent, s/p 1500cc IVF, c/w maintenance IVF   - will continue with vancomycin and ceftriaxone pending final cultures

## 2018-12-15 NOTE — ED ADULT NURSE REASSESSMENT NOTE - NS ED NURSE REASSESS COMMENT FT1
As per doctor Juan Daniel patient is to get Vancomycin 12 hours after getting first dose of Vancomycin. Dose is rescheduled in MAR.

## 2018-12-15 NOTE — PROGRESS NOTE ADULT - PROBLEM SELECTOR PLAN 3
- Na+ 131 in AM, will obtain urine studies, hyponatremia could be 2/2 to SIADH    - c/w NS @ 100mL/hr   - continue trend BMP

## 2018-12-15 NOTE — H&P ADULT - NSHPREVIEWOFSYSTEMS_GEN_ALL_CORE
REVIEW OF SYSTEMS:  CONSTITUTIONAL: +weakness, +fevers or chills  EYES: no visual changes  ENT: No vertigo or throat pain   NECK: +pain or stiffness  RESPIRATORY: No cough, wheezing, hemoptysis; No shortness of breath  CARDIOVASCULAR: No chest pain or palpitations  GASTROINTESTINAL: No abdominal or epigastric pain. No nausea, vomiting, or hematemesis; No diarrhea or constipation. No melena or hematochezia.  GENITOURINARY: No dysuria, frequency or hematuria  NEUROLOGICAL: No numbness or weakness  SKIN: No itching, rashes REVIEW OF SYSTEMS:  CONSTITUTIONAL: +weakness, +fevers or chills  EYES: no visual changes  ENT: No vertigo or throat pain, poor taste and smell  NECK: +pain or stiffness  RESPIRATORY: No cough, wheezing, hemoptysis; No shortness of breath  CARDIOVASCULAR: No chest pain or palpitations  GASTROINTESTINAL: No abdominal or epigastric pain. No nausea, vomiting, or hematemesis; No diarrhea or constipation. No melena or hematochezia.  GENITOURINARY: No dysuria, frequency or hematuria  NEUROLOGICAL: No numbness or weakness  SKIN: No itching, rashes

## 2018-12-15 NOTE — H&P ADULT - PROBLEM SELECTOR PLAN 5
unclear etiology  f/u with PMD for past work up  likely no relation to current neurologic condition with meningitis

## 2018-12-15 NOTE — H&P ADULT - ASSESSMENT
43M with no significant PMHx who presents with fevers and headaches of 2 week duration likely 2/2 bacterial meningitis. 43M with no significant PMHx who presents with fevers and headaches of 2 week duration likely 2/2 meningitis.

## 2018-12-15 NOTE — H&P ADULT - PROBLEM SELECTOR PLAN 1
- headache + fever of 2 week duration, CTH negative   - CSF with glucose 98, Protein 120, Total Nucleated Cell 1460 with segmented neutrophils (25) consistent with bacterial meningitis   - continue with vancomycin 1500g BID, ceftriaxone 2g BID, pt. received abx prior to steroids, no indication to initiate dexamethasone at this time - headache + fever of 2 week duration, CTH negative   - CSF with glucose 98, Protein 120, Total Nucleated Cell 1460 with segmented neutrophils (25) possibly 2/2 bacterial meningitis, however pt. has had symptoms for two weeks and lower neutrophil count than expectd  - continue with vancomycin 1500g BID, ceftriaxone 2g BID, pt. received abx prior to steroids, no indication to initiate dexamethasone at this time - headache + fever of 2 week duration, CTH negative   - CSF with glucose 98, Protein 120, Total Nucleated Cell 1460 with segmented neutrophils (25) possibly 2/2 bacterial meningitis, however pt. has had symptoms for two weeks and lower neutrophil count than expected, f/u CSF PCR, AFB culture not sent - will try to add on   - continue with vancomycin 1500mg BID, ceftriaxone 2g BID, pt. received abx prior to steroids, no indication to initiate dexamethasone at this time  - Hep B/C antibodies, HIV screen, ID consult in AM - headache + fever of 2 week duration, CTH negative   - CSF with glucose 98, Protein 120, Total Nucleated Cell 1460 with segmented neutrophils (25) possibly 2/2 bacterial meningitis, however pt. has had symptoms for two weeks and lower neutrophil count than expected, f/u CSF PCR, AFB culture not sent - added on AFB   - continue with vancomycin 1500mg BID, ceftriaxone 2g BID, pt. received abx prior to steroids, no indication to initiate dexamethasone at this time  - Hep B/C antibodies, HIV screen, ID consult in AM

## 2018-12-15 NOTE — H&P ADULT - PROBLEM SELECTOR PLAN 4
repeat labs in am and if still low would check serum and urine osm ? siadh with meningitis vs dehydration

## 2018-12-15 NOTE — PROGRESS NOTE ADULT - PROBLEM SELECTOR PLAN 1
- headache + fever of 2 week duration, CTH negative   - CSF with glucose 98, Protein 120, Total Nucleated Cell 1460 with segmented neutrophils (25) possibly 2/2 bacterial meningitis, however pt. has had symptoms for two weeks and lower neutrophil count than expected, f/u CSF PCR, AFB culture  - continue with vancomycin 1500mg BID, ceftriaxone 2g BID, pt. received abx prior to steroids, no indication to initiate dexamethasone at this time  - f/u Hep B/C antibodies, HIV results   -f/u BCx and UCx   - pending ID consult

## 2018-12-15 NOTE — PROGRESS NOTE ADULT - ASSESSMENT
43M with no significant PMHx who presents with fevers and headaches of 2 week duration, s/p LP in the ED, likely 2/2 bacterial meningitis. Currently hemodynamically stable, pending further ID eval

## 2018-12-16 LAB
ALBUMIN SERPL ELPH-MCNC: 3.7 G/DL — SIGNIFICANT CHANGE UP (ref 3.3–5)
ALP SERPL-CCNC: 68 U/L — SIGNIFICANT CHANGE UP (ref 40–120)
ALT FLD-CCNC: 65 U/L — HIGH (ref 10–45)
ANION GAP SERPL CALC-SCNC: 14 MMOL/L — SIGNIFICANT CHANGE UP (ref 5–17)
AST SERPL-CCNC: 21 U/L — SIGNIFICANT CHANGE UP (ref 10–40)
BILIRUB SERPL-MCNC: 0.4 MG/DL — SIGNIFICANT CHANGE UP (ref 0.2–1.2)
BUN SERPL-MCNC: 16 MG/DL — SIGNIFICANT CHANGE UP (ref 7–23)
CALCIUM SERPL-MCNC: 8.5 MG/DL — SIGNIFICANT CHANGE UP (ref 8.4–10.5)
CHLORIDE SERPL-SCNC: 94 MMOL/L — LOW (ref 96–108)
CO2 SERPL-SCNC: 23 MMOL/L — SIGNIFICANT CHANGE UP (ref 22–31)
CREAT SERPL-MCNC: 1.01 MG/DL — SIGNIFICANT CHANGE UP (ref 0.5–1.3)
GLUCOSE SERPL-MCNC: 107 MG/DL — HIGH (ref 70–99)
HCT VFR BLD CALC: 37.3 % — LOW (ref 39–50)
HGB BLD-MCNC: 12.6 G/DL — LOW (ref 13–17)
LDH SERPL L TO P-CCNC: 184 U/L — SIGNIFICANT CHANGE UP (ref 50–242)
MAGNESIUM SERPL-MCNC: 2.1 MG/DL — SIGNIFICANT CHANGE UP (ref 1.6–2.6)
MCHC RBC-ENTMCNC: 28.2 PG — SIGNIFICANT CHANGE UP (ref 27–34)
MCHC RBC-ENTMCNC: 33.8 GM/DL — SIGNIFICANT CHANGE UP (ref 32–36)
MCV RBC AUTO: 83.4 FL — SIGNIFICANT CHANGE UP (ref 80–100)
PHOSPHATE SERPL-MCNC: 1.9 MG/DL — LOW (ref 2.5–4.5)
PLATELET # BLD AUTO: 422 K/UL — HIGH (ref 150–400)
POTASSIUM SERPL-MCNC: 3.8 MMOL/L — SIGNIFICANT CHANGE UP (ref 3.5–5.3)
POTASSIUM SERPL-SCNC: 3.8 MMOL/L — SIGNIFICANT CHANGE UP (ref 3.5–5.3)
PROT SERPL-MCNC: 7 G/DL — SIGNIFICANT CHANGE UP (ref 6–8.3)
RBC # BLD: 4.47 M/UL — SIGNIFICANT CHANGE UP (ref 4.2–5.8)
RBC # FLD: 13.3 % — SIGNIFICANT CHANGE UP (ref 10.3–14.5)
SODIUM SERPL-SCNC: 131 MMOL/L — LOW (ref 135–145)
VANCOMYCIN TROUGH SERPL-MCNC: 10.3 UG/ML — SIGNIFICANT CHANGE UP (ref 10–20)
WBC # BLD: 7.6 K/UL — SIGNIFICANT CHANGE UP (ref 3.8–10.5)
WBC # FLD AUTO: 7.6 K/UL — SIGNIFICANT CHANGE UP (ref 3.8–10.5)

## 2018-12-16 RX ORDER — KETOROLAC TROMETHAMINE 30 MG/ML
15 SYRINGE (ML) INJECTION ONCE
Qty: 0 | Refills: 0 | Status: DISCONTINUED | OUTPATIENT
Start: 2018-12-16 | End: 2018-12-16

## 2018-12-16 RX ORDER — ONDANSETRON 8 MG/1
4 TABLET, FILM COATED ORAL ONCE
Qty: 0 | Refills: 0 | Status: COMPLETED | OUTPATIENT
Start: 2018-12-16 | End: 2018-12-16

## 2018-12-16 RX ORDER — KETOROLAC TROMETHAMINE 30 MG/ML
15 SYRINGE (ML) INJECTION EVERY 8 HOURS
Qty: 0 | Refills: 0 | Status: DISCONTINUED | OUTPATIENT
Start: 2018-12-16 | End: 2018-12-17

## 2018-12-16 RX ORDER — SODIUM CHLORIDE 9 MG/ML
1000 INJECTION INTRAMUSCULAR; INTRAVENOUS; SUBCUTANEOUS
Qty: 0 | Refills: 0 | Status: DISCONTINUED | OUTPATIENT
Start: 2018-12-16 | End: 2018-12-20

## 2018-12-16 RX ORDER — ONDANSETRON 8 MG/1
4 TABLET, FILM COATED ORAL ONCE
Qty: 0 | Refills: 0 | Status: DISCONTINUED | OUTPATIENT
Start: 2018-12-16 | End: 2018-12-16

## 2018-12-16 RX ADMIN — Medication 15 MILLIGRAM(S): at 10:00

## 2018-12-16 RX ADMIN — CEFTRIAXONE 100 GRAM(S): 500 INJECTION, POWDER, FOR SOLUTION INTRAMUSCULAR; INTRAVENOUS at 06:37

## 2018-12-16 RX ADMIN — Medication 250 MILLIGRAM(S): at 02:12

## 2018-12-16 RX ADMIN — CEFTRIAXONE 100 GRAM(S): 500 INJECTION, POWDER, FOR SOLUTION INTRAMUSCULAR; INTRAVENOUS at 18:56

## 2018-12-16 RX ADMIN — SODIUM CHLORIDE 100 MILLILITER(S): 9 INJECTION INTRAMUSCULAR; INTRAVENOUS; SUBCUTANEOUS at 12:01

## 2018-12-16 RX ADMIN — Medication 62.5 MILLIMOLE(S): at 10:13

## 2018-12-16 RX ADMIN — Medication 250 MILLIGRAM(S): at 09:12

## 2018-12-16 RX ADMIN — Medication 15 MILLIGRAM(S): at 09:25

## 2018-12-16 RX ADMIN — ONDANSETRON 4 MILLIGRAM(S): 8 TABLET, FILM COATED ORAL at 06:37

## 2018-12-16 RX ADMIN — Medication 250 MILLIGRAM(S): at 22:29

## 2018-12-16 RX ADMIN — Medication 15 MILLIGRAM(S): at 17:41

## 2018-12-16 RX ADMIN — Medication 250 MILLIGRAM(S): at 16:53

## 2018-12-16 NOTE — PROGRESS NOTE ADULT - ASSESSMENT
43M with no significant PMHx who presents with fevers and headaches of 2 week duration, s/p LP in the ED, likely 2/2 bacterial meningitis. Currently hemodynamically stable, ID following.

## 2018-12-16 NOTE — PROGRESS NOTE ADULT - SUBJECTIVE AND OBJECTIVE BOX
Cheryl Vera M.D.   PGY-2 | Internal Medicine   [phone] | [id number]        Patient is a 43y old  Male who presents with a chief complaint of meningitis (15 Dec 2018 10:55)        SUBJECTIVE / OVERNIGHT EVENTS: Patient had no acute events overnight. Patient seen and examined at bedside this morning. Patient complaining of 8/10 headache this morning. No visual difficulties but with photophobia.     ROS: [ - ] Fever [ - ] Chills [ - ] Nausea/Vomiting [ - ] Chest Pain [ - ] Shortness of breath     MEDICATIONS  (STANDING):  cefTRIAXone   IVPB 2 Gram(s) IV Intermittent every 12 hours  sodium chloride 0.9%. 1000 milliLiter(s) (100 mL/Hr) IV Continuous <Continuous>  sodium phosphate IVPB 15 milliMole(s) IV Intermittent once  vancomycin  IVPB 1000 milliGRAM(s) IV Intermittent every 8 hours    MEDICATIONS  (PRN):  acetaminophen   Tablet .. 650 milliGRAM(s) Oral every 6 hours PRN Temp greater or equal to 38C (100.4F)  acetaminophen   Tablet .. 650 milliGRAM(s) Oral every 6 hours PRN Moderate Pain (4 - 6)      Vital Signs Last 24 Hrs  T(C): 37.7 (16 Dec 2018 09:31), Max: 38.3 (15 Dec 2018 11:28)  T(F): 99.8 (16 Dec 2018 09:31), Max: 100.9 (15 Dec 2018 11:28)  HR: 64 (16 Dec 2018 09:31) (56 - 70)  BP: 119/68 (16 Dec 2018 09:31) (108/68 - 126/74)  BP(mean): --  RR: 18 (16 Dec 2018 09:31) (18 - 20)  SpO2: 95% (16 Dec 2018 09:31) (93% - 95%)  CAPILLARY BLOOD GLUCOSE        I&O's Summary    15 Dec 2018 07:01  -  16 Dec 2018 07:00  --------------------------------------------------------  IN: 3000 mL / OUT: 1450 mL / NET: 1550 mL        PHYSICAL EXAM  GENERAL: NAD, lying comfortably in bed in dark, warm compresses on forehead  HEAD:  Atraumatic, Normocephalic  EYES: EOMI, PERRLA, conjunctiva and sclera clear  NECK: Supple, No JVD  CHEST/LUNG: Clear to auscultation bilaterally; No wheeze  HEART: Regular rate and rhythm; No murmurs, rubs, or gallops  ABDOMEN: Soft, Nontender, Nondistended; Bowel sounds present  EXTREMITIES:  2+ Peripheral Pulses, No clubbing, cyanosis, or edema  NEURO: AAOx3, non-focal  SKIN: No rashes or lesions      LABS:                        12.5   10.28 )-----------( 456      ( 15 Dec 2018 09:18 )             37.7     12-16    131<L>  |  94<L>  |  16  ----------------------------<  107<H>  3.8   |  23  |  1.01    Ca    8.5      16 Dec 2018 07:31  Phos  1.9     12-16  Mg     2.1     12-16    TPro  7.0  /  Alb  3.7  /  TBili  0.4  /  DBili  x   /  AST  21  /  ALT  65<H>  /  AlkPhos  68  12-16          Urinalysis Basic - ( 14 Dec 2018 19:55 )    Color: Yellow / Appearance: Clear / S.024 / pH: x  Gluc: x / Ketone: Trace  / Bili: Negative / Urobili: Negative   Blood: x / Protein: Trace / Nitrite: Negative   Leuk Esterase: Negative / RBC: 1 /hpf / WBC 1 /hpf   Sq Epi: x / Non Sq Epi: 1 /hpf / Bacteria: Negative            Consultant(s) Notes Reviewed:  ID,

## 2018-12-16 NOTE — PROGRESS NOTE ADULT - ASSESSMENT
43M previously healthy was admitted 12/14/18 for lymphocytic meningitis,  His meningitis has been of 3 weeks in duration and is lymphocytic. Continued pain-  HIV negative  Negative CSF PCR panel    Suggest  Repeat LP tomorrow  for CSF flow cytometry, large volume AFB culture and repeat cell count with chemistries  Continue current empiric therapy. (if CSF cell count about the same, will consider discontinue Ceftriaxone/Vanco)

## 2018-12-16 NOTE — PROGRESS NOTE ADULT - SUBJECTIVE AND OBJECTIVE BOX
Follow Up:  lymphocytic meningitis    Interval History/ROS:  continuedpain about the same --- transient relief from analgesics; poor po intake, nausea    Allergies  No Known Allergies    ANTIMICROBIALS:  cefTRIAXone   IVPB 2 every 12 hours  vancomycin  IVPB 1000 every 8 hours      OTHER MEDS:  MEDICATIONS  (STANDING):  acetaminophen   Tablet .. 650 every 6 hours PRN  acetaminophen   Tablet .. 650 every 6 hours PRN  ketorolac   Injectable 15 every 8 hours PRN      Vital Signs Last 24 Hrs  T(C): 36.7 (16 Dec 2018 17:37), Max: 37.7 (16 Dec 2018 09:31)  T(F): 98.1 (16 Dec 2018 17:37), Max: 99.8 (16 Dec 2018 09:31)  HR: 55 (16 Dec 2018 17:37) (55 - 70)  BP: 129/77 (16 Dec 2018 17:37) (110/60 - 129/77)  BP(mean): --  RR: 16 (16 Dec 2018 17:37) (16 - 20)  SpO2: 95% (16 Dec 2018 17:37) (93% - 98%)    PHYSICAL EXAM:  General: WN/WD NAD, Non-toxic  Neurology: A&Ox3, nonfocal  Respiratory: Clear to auscultation bilaterally  CV: RRR, S1S2, no murmurs, rubs or gallops  Abdominal: Soft, Non-tender, non-distended, normal bowel sounds  Extremities: No edema, + peripheral pulses  Line Sites: Clear  Skin: No rash                          12.6   7.60  )-----------( 422      ( 16 Dec 2018 10:58 )             37.3       12-16    131<L>  |  94<L>  |  16  ----------------------------<  107<H>  3.8   |  23  |  1.01    Ca    8.5      16 Dec 2018 07:31  Phos  1.9     12-16  Mg     2.1     12-16    TPro  7.0  /  Alb  3.7  /  TBili  0.4  /  DBili  x   /  AST  21  /  ALT  65<H>  /  AlkPhos  68  12-16      Urinalysis Basic - ( 14 Dec 2018 19:55 )    Color: Yellow / Appearance: Clear / S.024 / pH: x  Gluc: x / Ketone: Trace  / Bili: Negative / Urobili: Negative   Blood: x / Protein: Trace / Nitrite: Negative   Leuk Esterase: Negative / RBC: 1 /hpf / WBC 1 /hpf   Sq Epi: x / Non Sq Epi: 1 /hpf / Bacteria: Negative        MICROBIOLOGY:  .CSF  12-15-18 --  --  --      .CSF CSF  12-15-18   No growth  --    polymorphonuclear leukocytes seen  No organisms seen  by cytocentrifuge      .Blood Blood  18   No growth to date.  --  --      .Blood Blood  18   No growth to date.  --  --      .Urine Clean Catch (Midstream)  18   No growth  --  --        .CSF  .CSF CSF  .Blood Blood  .Blood Blood  .Urine Clean Catch (Midstream)    Vancomycin Level, Trough: 10.3 ug/mL (18 @ 02:11)  v    Rapid RVP Result: NotDetec ( @ 18:41)        RADIOLOGY:    Marlon Garcia MD; Division of Infectious Disease; Pager: [phone]; nights and weekends: [phone]

## 2018-12-16 NOTE — PROGRESS NOTE ADULT - PROBLEM SELECTOR PLAN 1
- headache + fever of 2 week duration, CTH negative   - CSF with glucose 98, Protein 120, Total Nucleated Cell 1460 with segmented neutrophils (25) possibly 2/2 bacterial meningitis, however pt. has had symptoms for two weeks and lower neutrophil count than expected  - CSF PCR negative, AFB culture unable to perform due to now enough specimen   - continue with vancomycin 1500mg BID, ceftriaxone 2g BID, pt. received abx prior to steroids, no indication to initiate dexamethasone at this time  - Hep B/C antibodies negative, HIV negative    - f/u BCx and UCx   - ID consulted, appreciating recommendations, repeat LP in a few days if not improved for flow cytometry, large volume AFB culture and cell count, protein, glucose   - will check Quant Gold

## 2018-12-17 DIAGNOSIS — R21 RASH AND OTHER NONSPECIFIC SKIN ERUPTION: ICD-10-CM

## 2018-12-17 LAB
ANION GAP SERPL CALC-SCNC: 10 MMOL/L — SIGNIFICANT CHANGE UP (ref 5–17)
APPEARANCE CSF: CLEAR — SIGNIFICANT CHANGE UP
BACTERIAL AG PNL SER: 3 % — SIGNIFICANT CHANGE UP
BUN SERPL-MCNC: 12 MG/DL — SIGNIFICANT CHANGE UP (ref 7–23)
CALCIUM SERPL-MCNC: 8.6 MG/DL — SIGNIFICANT CHANGE UP (ref 8.4–10.5)
CHLORIDE SERPL-SCNC: 97 MMOL/L — SIGNIFICANT CHANGE UP (ref 96–108)
CO2 SERPL-SCNC: 27 MMOL/L — SIGNIFICANT CHANGE UP (ref 22–31)
COLOR CSF: SIGNIFICANT CHANGE UP
CREAT SERPL-MCNC: 1.01 MG/DL — SIGNIFICANT CHANGE UP (ref 0.5–1.3)
CULTURE RESULTS: NO GROWTH — SIGNIFICANT CHANGE UP
GLUCOSE CSF-MCNC: 40 MG/DL — SIGNIFICANT CHANGE UP (ref 40–70)
GLUCOSE SERPL-MCNC: 91 MG/DL — SIGNIFICANT CHANGE UP (ref 70–99)
HCT VFR BLD CALC: 39.3 % — SIGNIFICANT CHANGE UP (ref 39–50)
HGB BLD-MCNC: 13.1 G/DL — SIGNIFICANT CHANGE UP (ref 13–17)
HIV-1 VIRAL LOAD RESULT: SIGNIFICANT CHANGE UP
HIV1 RNA # SERPL NAA+PROBE: SIGNIFICANT CHANGE UP
HIV1 RNA SER-IMP: SIGNIFICANT CHANGE UP
HIV1 RNA SERPL NAA+PROBE-ACNC: SIGNIFICANT CHANGE UP
HIV1 RNA SERPL NAA+PROBE-LOG#: SIGNIFICANT CHANGE UP LG COP/ML
LYMPHOCYTES # CSF: 82 % — HIGH (ref 40–80)
MAGNESIUM SERPL-MCNC: 2.1 MG/DL — SIGNIFICANT CHANGE UP (ref 1.6–2.6)
MCHC RBC-ENTMCNC: 28.5 PG — SIGNIFICANT CHANGE UP (ref 27–34)
MCHC RBC-ENTMCNC: 33.3 GM/DL — SIGNIFICANT CHANGE UP (ref 32–36)
MCV RBC AUTO: 85.6 FL — SIGNIFICANT CHANGE UP (ref 80–100)
MONOS+MACROS NFR CSF: 8 % — LOW (ref 15–45)
NEUTROPHILS # CSF: 4 % — SIGNIFICANT CHANGE UP (ref 0–6)
NRBC NFR CSF: 430 /UL — HIGH (ref 0–5)
OSMOLALITY UR: 922 MOS/KG — SIGNIFICANT CHANGE UP (ref 50–1200)
OTHER CELLS CSF MANUAL: 3 % — HIGH (ref 0–0)
PHOSPHATE SERPL-MCNC: 2.9 MG/DL — SIGNIFICANT CHANGE UP (ref 2.5–4.5)
PLATELET # BLD AUTO: 396 K/UL — SIGNIFICANT CHANGE UP (ref 150–400)
POTASSIUM SERPL-MCNC: 3.8 MMOL/L — SIGNIFICANT CHANGE UP (ref 3.5–5.3)
POTASSIUM SERPL-SCNC: 3.8 MMOL/L — SIGNIFICANT CHANGE UP (ref 3.5–5.3)
PROT CSF-MCNC: 96 MG/DL — HIGH (ref 15–45)
RBC # BLD: 4.59 M/UL — SIGNIFICANT CHANGE UP (ref 4.2–5.8)
RBC # CSF: 1 /UL — HIGH (ref 0–0)
RBC # FLD: 13.2 % — SIGNIFICANT CHANGE UP (ref 10.3–14.5)
SODIUM SERPL-SCNC: 134 MMOL/L — LOW (ref 135–145)
SPECIMEN SOURCE: SIGNIFICANT CHANGE UP
TUBE TYPE: SIGNIFICANT CHANGE UP
WBC # BLD: 8.58 K/UL — SIGNIFICANT CHANGE UP (ref 3.8–10.5)
WBC # FLD AUTO: 8.58 K/UL — SIGNIFICANT CHANGE UP (ref 3.8–10.5)

## 2018-12-17 RX ORDER — LANOLIN ALCOHOL/MO/W.PET/CERES
3 CREAM (GRAM) TOPICAL AT BEDTIME
Qty: 0 | Refills: 0 | Status: DISCONTINUED | OUTPATIENT
Start: 2018-12-17 | End: 2018-12-20

## 2018-12-17 RX ORDER — LIDOCAINE HCL 20 MG/ML
10 VIAL (ML) INJECTION ONCE
Qty: 0 | Refills: 0 | Status: COMPLETED | OUTPATIENT
Start: 2018-12-17 | End: 2018-12-17

## 2018-12-17 RX ORDER — OXYCODONE HYDROCHLORIDE 5 MG/1
5 TABLET ORAL ONCE
Qty: 0 | Refills: 0 | Status: DISCONTINUED | OUTPATIENT
Start: 2018-12-17 | End: 2018-12-17

## 2018-12-17 RX ADMIN — CEFTRIAXONE 100 GRAM(S): 500 INJECTION, POWDER, FOR SOLUTION INTRAMUSCULAR; INTRAVENOUS at 17:46

## 2018-12-17 RX ADMIN — Medication 250 MILLIGRAM(S): at 21:50

## 2018-12-17 RX ADMIN — Medication 1 APPLICATION(S): at 17:46

## 2018-12-17 RX ADMIN — Medication 10 MILLILITER(S): at 16:23

## 2018-12-17 RX ADMIN — SODIUM CHLORIDE 100 MILLILITER(S): 9 INJECTION INTRAMUSCULAR; INTRAVENOUS; SUBCUTANEOUS at 06:30

## 2018-12-17 RX ADMIN — OXYCODONE HYDROCHLORIDE 5 MILLIGRAM(S): 5 TABLET ORAL at 17:37

## 2018-12-17 RX ADMIN — Medication 250 MILLIGRAM(S): at 13:31

## 2018-12-17 RX ADMIN — Medication 250 MILLIGRAM(S): at 09:09

## 2018-12-17 RX ADMIN — CEFTRIAXONE 100 GRAM(S): 500 INJECTION, POWDER, FOR SOLUTION INTRAMUSCULAR; INTRAVENOUS at 06:28

## 2018-12-17 RX ADMIN — OXYCODONE HYDROCHLORIDE 5 MILLIGRAM(S): 5 TABLET ORAL at 16:32

## 2018-12-17 NOTE — CONSULT NOTE ADULT - SUBJECTIVE AND OBJECTIVE BOX
HPI:  42 y/o M admitted for fevers and headache w/ concern for meningitis now w/ rash in groin for 2 days. Pt notes mild occasional itch and denies pain. Denies rashes elsewhere. Has not treated it. Multiple new drug exposures this hospital stay, most notably: vancomycin (12/14-current) and ceftriaxone (12/14-current).     PAST MEDICAL & SURGICAL HISTORY:  No significant past medical history  No significant past surgical history      Review of Systems:  Constitutional: endorses fevers, chills  HEENT: denies odynophagia or dysphagia  Cardiovascular: denies palpitations  Respiratory: denies SOB, wheezing  Gastrointestinal: denies N/V/D, abdominal pain  : denies dysuria  MSK: denies weakness, joint pain  Skin: denies new rashes or masses unless otherwise specified in hpi    MEDICATIONS  (STANDING):  cefTRIAXone   IVPB 2 Gram(s) IV Intermittent every 12 hours  sodium chloride 0.9%. 1000 milliLiter(s) (100 mL/Hr) IV Continuous <Continuous>  vancomycin  IVPB 1000 milliGRAM(s) IV Intermittent every 8 hours    MEDICATIONS  (PRN):  acetaminophen   Tablet .. 650 milliGRAM(s) Oral every 6 hours PRN Temp greater or equal to 38C (100.4F)  acetaminophen   Tablet .. 650 milliGRAM(s) Oral every 6 hours PRN Moderate Pain (4 - 6)  ketorolac   Injectable 15 milliGRAM(s) IV Push every 8 hours PRN Severe Pain (7 - 10)      Allergies    No Known Allergies    Intolerances        SOCIAL HISTORY: denies drug use    FAMILY HISTORY:  No pertinent family history in first degree relatives      Vital Signs Last 24 Hrs  T(C): 37.1 (17 Dec 2018 07:34), Max: 37.1 (17 Dec 2018 04:47)  T(F): 98.7 (17 Dec 2018 07:34), Max: 98.8 (17 Dec 2018 04:47)  HR: 56 (17 Dec 2018 07:34) (55 - 58)  BP: 115/75 (17 Dec 2018 07:34) (115/75 - 129/77)  BP(mean): --  RR: 17 (17 Dec 2018 07:34) (16 - 17)  SpO2: 98% (17 Dec 2018 04:47) (95% - 98%)    Physical Exam:  The patient was alert and oriented X 3, well nourished, and in no apparent distress. Oropharynx showed no ulcerations. There was no visible lymphadenopathy. Conjunctiva were non-injected. There was no clubbing or edema of extremities.    The scalp, hair, face, eyebrows, lips, oropharynx , neck, chest, back, buttocks, extremities X 4, hands, feet, nails were examined. There was no hyperhidrosis or bromhidrosis.     The following lesions are noted:   - Erythematous pink papule and macules coalescing into plaques on bilateral inguinal folds (R>L)    LABS:                        13.1   8.58  )-----------( 396      ( 17 Dec 2018 09:12 )             39.3     12-17    134<L>  |  97  |  12  ----------------------------<  91  3.8   |  27  |  1.01    Ca    8.6      17 Dec 2018 07:27  Phos  2.9     12-17  Mg     2.1     12-17    TPro  7.0  /  Alb  3.7  /  TBili  0.4  /  DBili  x   /  AST  21  /  ALT  65<H>  /  AlkPhos  68  12-16          RADIOLOGY & ADDITIONAL STUDIES: no relevant studies

## 2018-12-17 NOTE — PROCEDURE NOTE - ADDITIONAL PROCEDURE DETAILS
Large volume LP required as per ID. Monitor for post LP Ha, bleeding, neuro deficits. D/W primary team, nurse, patient.

## 2018-12-17 NOTE — PROGRESS NOTE ADULT - PROBLEM SELECTOR PLAN 4
- DVT ppx: IMPROVE score of 0, encourage ambulation  - DIET: Regular - hyponatremia improving, Na+ 134 in AM, hyponatremia could be 2/2 to SIADH    - c/w NS @ 100mL/hr   - continue trend BMP

## 2018-12-17 NOTE — PROGRESS NOTE ADULT - PROBLEM SELECTOR PLAN 3
- Na+ 131 in AM, hyponatremia could be 2/2 to SIADH    - c/w NS @ 100mL/hr   - continue trend BMP - newly developed purpuric, erythematous, macular moribilliform rash on thigh and groin area b/l, neg vesicles    - could be drug exanthem   - pending derm eval

## 2018-12-17 NOTE — CONSULT NOTE ADULT - PROBLEM SELECTOR RECOMMENDATION 9
- Recommend triamcinolone 0.1% cream bid to affected areas prn itchy rash, avoid use on face and groin  - Triamcinolone is only helpful if the patient has itch. It will not modify the course of the rash  - This type of drug rash often lasts for 1-2 weeks and may desquamate later in the course, which will appear as a small amount of skin peeling similar to a sunburn. The rash may spread more to the distal extremities and may become darker in color before it starts to improve. Blistering, severe skin pain, mouth pain/sores, dysuria, pain with defecation, and large sheets of skin detachment are never normal for this type of rash. Please call dermatology if any of these other symptoms develop.  - If pt needs follow up after discharge, he can follow up with Dr. Cleve Hayes or any other provider in dermatology clinic. [phone]  - Please reconsult if you have new questions or concerns

## 2018-12-17 NOTE — PROGRESS NOTE ADULT - SUBJECTIVE AND OBJECTIVE BOX
Follow Up:  lymphocytic meningitis    Interval History/ROS:  continued pain - maybe somewhat improved -- notes new rash over left hip -local pruritus  -Wife at bedside notes recent URI - their young daughter have had viral syndrome - their son has been in [facility] and is well    Allergies  No Known Allergies    ANTIMICROBIALS:  cefTRIAXone   IVPB 2 every 12 hours  vancomycin  IVPB 1000 every 8 hours    OTHER MEDS:  MEDICATIONS  (STANDING):  acetaminophen   Tablet .. 650 every 6 hours PRN  acetaminophen   Tablet .. 650 every 6 hours PRN  ketorolac   Injectable 15 every 8 hours PRN      Vital Signs Last 24 Hrs  T(C): 37.1 (17 Dec 2018 07:34), Max: 37.1 (17 Dec 2018 04:47)  T(F): 98.7 (17 Dec 2018 07:34), Max: 98.8 (17 Dec 2018 04:47)  HR: 56 (17 Dec 2018 07:34) (55 - 58)  BP: 115/75 (17 Dec 2018 07:34) (115/75 - 129/77)  BP(mean): --  RR: 17 (17 Dec 2018 07:34) (16 - 17)  SpO2: 98% (17 Dec 2018 04:47) (95% - 98%)    PHYSICAL EXAM:  General: WN/WD NAD, Non-toxic  Neurology: A&Ox3, nonfocal  Respiratory: Clear to auscultation bilaterally  CV: RRR, S1S2, no murmurs, rubs or gallops  Abdominal: Soft, Non-tender, non-distended, normal bowel sounds  Extremities: No edema, + peripheral pulses  Line Sites: Clear  Skin: No rash -deep erythematous discoloration right hip area                        13.1   8.58  )-----------( 396      ( 17 Dec 2018 09:12 )             39.3   WBC Count: 8.58 (12-17 @ 09:12)  WBC Count: 7.60 (12-16 @ 10:58)  WBC Count: 10.28 (12-15 @ 09:18)  WBC Count: 12.2 (12-14 @ 18:41)  12-17    134<L>  |  97  |  12  ----------------------------<  91  3.8   |  27  |  1.01    Ca    8.6      17 Dec 2018 07:27  Phos  2.9     12-17  Mg     2.1     12-17    TPro  7.0  /  Alb  3.7  /  TBili  0.4  /  DBili  x   /  AST  21  /  ALT  65<H>  /  AlkPhos  68  12-16      MICROBIOLOGY:  .CSF  12-15-18 --  --  --      .CSF CSF  12-15-18   No growth  --    polymorphonuclear leukocytes seen  No organisms seen  by cytocentrifuge      .Blood Blood  12-14-18   No growth to date.  --  --      .Blood Blood  12-14-18   No growth to date.  --  --      .Urine Clean Catch (Midstream)  12-14-18   No growth  --  --    Rapid RVP Result: NotDetec (12-14 @ 18:41)    CSF PCR Panel (12.15.18 @ 04:46)    CSF PCR Result: NotDete: The meningitis/encephalitis (ME) panel (CSFPCR) is a PCR based assay that  screens for: Escherichia coli; Haemophilus influenzae; Listeria  monocytogenes; Neisseria meningitidis; Streptococcus agalactiae;  Streptococcus pneumoniae; CMV; Enterovirus; HSV-1; HSV-2; Human  herpesvirus 6; Parechovirus; VZV and Cryptococcus. Result should be  interpreted in context of clinical presentation, imaging and other lab  tests. Positive predictive value may be lower in patients with normal CSF  chemistry and cell count.    HIV-1/2 Antigen/Antibody Screen by [facility] (12.15.18 @ 09:18)    HIV-1/2 Combo Result: Nonreact:    RADIOLOGY:  < from: CT Chest No Cont (12.14.18 @ 19:42) >  IMPRESSION: Bilateral subsegmental atelectasis. No pneumonia.    < end of copied text >      Marlon Garcia MD; Division of Infectious Disease; Pager: [phone]; nights and weekends: [phone]

## 2018-12-17 NOTE — PROGRESS NOTE ADULT - PROBLEM SELECTOR PLAN 2
- leukocytosis (12.2), febrile to 103, HR >90, lactate wnl.  - likely source meningitis, CT chest negative, RVP negative, UA negative   - LP done in ED, CSF with glucose 98, Protein 120, Total Nucleated Cell 1460 with segmented neutrophils (25)  - BCx, UCx sent, s/p 1500cc IVF, c/w maintenance IVF   - will continue with vancomycin and ceftriaxone pending final cultures - leukocytosis (12.2), febrile to 103, HR >90, lactate wnl.  - likely source meningitis, CT chest negative, RVP negative, UA negative   - LP done in ED, CSF with glucose 98, Protein 120, Total Nucleated Cell 1460 with segmented neutrophils (25)  - s/p 1500cc IVF, c/w maintenance IVF  - c/w vancomycin 1500mg BID, ceftriaxone 2g BID

## 2018-12-17 NOTE — PROGRESS NOTE ADULT - ASSESSMENT
43M previously healthy was admitted 12/14/18 for lymphocytic meningitis,  His meningitis has been of 3 weeks in duration and is lymphocytic. Continued pain-  HIV negative  Negative CSF PCR panel  new rash of unclear significance: could this be sequalae of enteroviral infection?    Suggest  Repeat LP   for CSF flow cytometry, large volume AFB culture and repeat cell count with chemistries  Continue current empiric therapy for now. (if CSF cell count about the same, will consider discontinue Ceftriaxone/Vanco)   Derm evaluation (If likely drug exanthem, would discontinue antibiotics)  Leptospirosis IgM ordered    discussed earlier today with resident

## 2018-12-17 NOTE — CONSULT NOTE ADULT - ASSESSMENT
44 y/o M admitted for fevers and headache w/ concern for meningitis now w/ rash in groin for 2 days. 42 y/o M admitted for fevers and headache w/ concern for meningitis now w/ rash in groin for 2 days. Favor symmetric drug-related intertriginous and flexural exanthem (SDRIFE) which is a benign form of drug exanthem. Most likely to CTX although vancomycin cannot be ruled out. This is a benign entity and does not typically progress to a severe drug hypersensitivity. Can start triamcinolone 0.1% cream bid to affected areas. As per last ID note, we can d/c both antibiotics. If these particular antibiotics are necessary in the future, they can be restarted and we can treat through the rash. Also cannot rule our viral exanthem in this case although favor drug exanthem as described above.    Discussed with attending Dr. Cleve Hayes. Will re-evaluate pt during formal clinical rounds and update assessment/plan at that time. 44 y/o M admitted for fevers and headache w/ concern for meningitis now w/ rash in groin for 2 days. Favor symmetric drug-related intertriginous and flexural exanthem (SDRIFE) which is a benign form of drug exanthem. Most likely to CTX although vancomycin cannot be ruled out. This is a benign entity and does not typically progress to a severe drug hypersensitivity. As per last ID note, we can d/c both antibiotics. If these particular antibiotics are necessary in the future, they can be restarted and we can treat through the rash. Also cannot rule our viral exanthem in this case although favor drug exanthem as described above.

## 2018-12-17 NOTE — PROGRESS NOTE ADULT - PROBLEM SELECTOR PLAN 1
- headache + fever of 2 week duration, CTH negative   - CSF with glucose 98, Protein 120, Total Nucleated Cell 1460 with segmented neutrophils (25), results are not consistent with bacterial or viral meningitis, neutrophils count is lower than expected for bacterial meningitis   - CSF PCR negative, AFB culture unable to perform due to now enough specimen   - continue with vancomycin 1500mg BID, ceftriaxone 2g BID, pt. received abx prior to steroids, no indication to initiate dexamethasone at this time  - Hep B/C antibodies negative, HIV negative    - f/u BCx and UCx   - ID consulted, appreciating recommendations, repeat LP in a few days if not improved for flow cytometry, large volume AFB culture and cell count, protein, glucose   - will check Quant Gold - headache + fever of 2 week duration, CTH negative   - CSF with glucose 98, Protein 120, Total Nucleated Cell 1460 with segmented neutrophils (25), results are not consistent with bacterial (neutrophils count is lower than expected for bacterial meningitis), could be viral vs fungi, however CSF PCR is negative, AFB culture unable to perform due to now enough specimen   - Per ID, would want to repeat LP today, and repeat CSF PCR, cell count, and AFB culture   - currently on vancomycin 1500mg BID, ceftriaxone 2g BID, can d/c Abx if repeated CSF PCR is negative   - Hep B/C antibodies negative, HIV negative  - BCx and UCx: negative so far

## 2018-12-17 NOTE — PROGRESS NOTE ADULT - ASSESSMENT
43M with no significant PMHx who presents with fevers and headaches of 2 week duration, s/p LP in the ED, admitted for meningitis of unclear organism. CSF PCR is negative. Currently afebrile, hemodynamically stable, headache improved, will repeat LP today per ID rec.

## 2018-12-17 NOTE — PROGRESS NOTE ADULT - SUBJECTIVE AND OBJECTIVE BOX
Froylan Patricia, PGY1  Pager: [phone]  After 7: Night Float pager  Alternate contact:     Patient is a 43y old  Male who presents with a chief complaint of meningitis (16 Dec 2018 18:20)      SUBJECTIVE / OVERNIGHT EVENTS:    MEDICATIONS  (STANDING):  cefTRIAXone   IVPB 2 Gram(s) IV Intermittent every 12 hours  sodium chloride 0.9%. 1000 milliLiter(s) (100 mL/Hr) IV Continuous <Continuous>  vancomycin  IVPB 1000 milliGRAM(s) IV Intermittent every 8 hours    MEDICATIONS  (PRN):  acetaminophen   Tablet .. 650 milliGRAM(s) Oral every 6 hours PRN Temp greater or equal to 38C (100.4F)  acetaminophen   Tablet .. 650 milliGRAM(s) Oral every 6 hours PRN Moderate Pain (4 - 6)  ketorolac   Injectable 15 milliGRAM(s) IV Push every 8 hours PRN Severe Pain (7 - 10)      Vital Signs Last 24 Hrs  T(C): 37.1 (17 Dec 2018 04:47), Max: 37.7 (16 Dec 2018 09:31)  T(F): 98.8 (17 Dec 2018 04:47), Max: 99.8 (16 Dec 2018 09:31)  HR: 58 (17 Dec 2018 04:47) (55 - 64)  BP: 115/75 (17 Dec 2018 04:47) (115/75 - 129/77)  BP(mean): --  RR: 16 (17 Dec 2018 04:47) (16 - 18)  SpO2: 98% (17 Dec 2018 04:47) (95% - 98%)  CAPILLARY BLOOD GLUCOSE        I&O's Summary    16 Dec 2018 07:01  -  17 Dec 2018 07:00  --------------------------------------------------------  IN: 3330 mL / OUT: 3100 mL / NET: 230 mL        PHYSICAL EXAM:  GENERAL: NAD, well-developed  EYES: EOMI, PERRLA, conjunctiva and sclera clear  NECK:  No JVD  CHEST/LUNG: CTABL ; No wheeze  HEART: RRR; No murmurs  ABDOMEN: Soft, Nontender, Nondistended; Bowel sounds present  EXTREMITIES:  2+ Peripheral Pulses, No edema  MUSCULOSKEL:   PSYCH: AAOx   NEUROLOGY: CN 2-12 grossly intact, strength, sensory,   SKIN: No rashes or lesions. No sacral ulcer    LABS:                        12.6   7.60  )-----------( 422      ( 16 Dec 2018 10:58 )             37.3     12-16    131<L>  |  94<L>  |  16  ----------------------------<  107<H>  3.8   |  23  |  1.01    Ca    8.5      16 Dec 2018 07:31  Phos  1.9     12-16  Mg     2.1     12-16    TPro  7.0  /  Alb  3.7  /  TBili  0.4  /  DBili  x   /  AST  21  /  ALT  65<H>  /  AlkPhos  68  12-16              Microbiology;      RADIOLOGY & ADDITIONAL TESTS:    Imaging Personally Reviewed:    Consultant(s) Notes Reviewed: Froylan Patricia, PGY1  Pager: [phone]  After 7: Night Float pager  Alternate contact:     Patient is a 43y old  Male who presents with a chief complaint of meningitis (16 Dec 2018 18:20)    SUBJECTIVE / OVERNIGHT EVENTS: No acute event overnight. Pt was seen and examined in AM. Complains of mild headache (dull, bilateral, tingling sensation on the forehead, non-radiating, 3/10, headache is improving overall), and a newly developed itchy rash on the thigh/groin area b/l. Denies     MEDICATIONS  (STANDING):  cefTRIAXone   IVPB 2 Gram(s) IV Intermittent every 12 hours  sodium chloride 0.9%. 1000 milliLiter(s) (100 mL/Hr) IV Continuous <Continuous>  vancomycin  IVPB 1000 milliGRAM(s) IV Intermittent every 8 hours    MEDICATIONS  (PRN):  acetaminophen   Tablet .. 650 milliGRAM(s) Oral every 6 hours PRN Temp greater or equal to 38C (100.4F)  acetaminophen   Tablet .. 650 milliGRAM(s) Oral every 6 hours PRN Moderate Pain (4 - 6)  ketorolac   Injectable 15 milliGRAM(s) IV Push every 8 hours PRN Severe Pain (7 - 10)      Vital Signs Last 24 Hrs  T(C): 37.1 (17 Dec 2018 04:47), Max: 37.7 (16 Dec 2018 09:31)  T(F): 98.8 (17 Dec 2018 04:47), Max: 99.8 (16 Dec 2018 09:31)  HR: 58 (17 Dec 2018 04:47) (55 - 64)  BP: 115/75 (17 Dec 2018 04:47) (115/75 - 129/77)  BP(mean): --  RR: 16 (17 Dec 2018 04:47) (16 - 18)  SpO2: 98% (17 Dec 2018 04:47) (95% - 98%)  CAPILLARY BLOOD GLUCOSE        I&O's Summary    16 Dec 2018 07:01  -  17 Dec 2018 07:00  --------------------------------------------------------  IN: 3330 mL / OUT: 3100 mL / NET: 230 mL      PHYSICAL EXAM  GENERAL: NAD, lying comfortably in bed in dark, warm compresses on forehead  HEAD:  Atraumatic, Normocephalic  EYES: EOMI, PERRLA, conjunctiva and sclera clear  NECK: Supple, No JVD  CHEST/LUNG: Clear to auscultation bilaterally; No wheeze  HEART: Regular rate and rhythm; No murmurs, rubs, or gallops  ABDOMEN: Soft, Nontender, Nondistended; Bowel sounds present  EXTREMITIES:  2+ Peripheral Pulses, No clubbing, cyanosis, or edema  NEURO: AAOx3, non-focal  SKIN:  purpuric, erythematous, macular moribilliform rash on thigh and groin area b/l, neg vesicles     LABS:                        12.6   7.60  )-----------( 422      ( 16 Dec 2018 10:58 )             37.3     12-16    131<L>  |  94<L>  |  16  ----------------------------<  107<H>  3.8   |  23  |  1.01    Ca    8.5      16 Dec 2018 07:31  Phos  1.9     12-16  Mg     2.1     12-16    TPro  7.0  /  Alb  3.7  /  TBili  0.4  /  DBili  x   /  AST  21  /  ALT  65<H>  /  AlkPhos  68  12-16    Microbiology;  CSF PCR Result: NotDete: The meningitis/encephalitis (ME) panel (CSFPCR) is a PCR based assay that  screens for: Escherichia coli; Haemophilus influenzae; Listeria  monocytogenes; Neisseria meningitidis; Streptococcus agalactiae;  Streptococcus pneumoniae; CMV; Enterovirus; HSV-1; HSV-2; Human  herpesvirus 6; Parechovirus; VZV and Cryptococcus. Result should be  interpreted in context of clinical presentation, imaging and other lab  tests. Positive predictive value may be lower in patients with normal CSF  chemistry and cell count. (12.15.18 @ 04:46)      Consultant(s) Notes Reviewed: ID Froylan Patricia, PGY1  Pager: [phone]  After 7: Night Float pager  Alternate contact:     Patient is a 43y old  Male who presents with a chief complaint of meningitis (16 Dec 2018 18:20)    SUBJECTIVE / OVERNIGHT EVENTS: No acute event overnight. Pt was seen and examined in AM. Complains of mild headache (dull, bilateral, tingling sensation on the forehead, non-radiating, 3/10, headache is improving overall), and a newly developed itchy rash on the thigh/groin area b/l. Denies fever, chills, SOB, chest pain, n/v, diarrhea/constipation.     MEDICATIONS  (STANDING):  cefTRIAXone   IVPB 2 Gram(s) IV Intermittent every 12 hours  sodium chloride 0.9%. 1000 milliLiter(s) (100 mL/Hr) IV Continuous <Continuous>  vancomycin  IVPB 1000 milliGRAM(s) IV Intermittent every 8 hours    MEDICATIONS  (PRN):  acetaminophen   Tablet .. 650 milliGRAM(s) Oral every 6 hours PRN Temp greater or equal to 38C (100.4F)  acetaminophen   Tablet .. 650 milliGRAM(s) Oral every 6 hours PRN Moderate Pain (4 - 6)  ketorolac   Injectable 15 milliGRAM(s) IV Push every 8 hours PRN Severe Pain (7 - 10)      Vital Signs Last 24 Hrs  T(C): 37.1 (17 Dec 2018 04:47), Max: 37.7 (16 Dec 2018 09:31)  T(F): 98.8 (17 Dec 2018 04:47), Max: 99.8 (16 Dec 2018 09:31)  HR: 58 (17 Dec 2018 04:47) (55 - 64)  BP: 115/75 (17 Dec 2018 04:47) (115/75 - 129/77)  BP(mean): --  RR: 16 (17 Dec 2018 04:47) (16 - 18)  SpO2: 98% (17 Dec 2018 04:47) (95% - 98%)  CAPILLARY BLOOD GLUCOSE        I&O's Summary    16 Dec 2018 07:01  -  17 Dec 2018 07:00  --------------------------------------------------------  IN: 3330 mL / OUT: 3100 mL / NET: 230 mL      PHYSICAL EXAM  GENERAL: NAD, lying comfortably in bed in dark, warm compresses on forehead  HEAD:  Atraumatic, Normocephalic  EYES: EOMI, PERRLA, conjunctiva and sclera clear  NECK: Supple, No JVD  CHEST/LUNG: Clear to auscultation bilaterally; No wheeze  HEART: Regular rate and rhythm; No murmurs, rubs, or gallops  ABDOMEN: Soft, Nontender, Nondistended; Bowel sounds present  EXTREMITIES:  2+ Peripheral Pulses, No clubbing, cyanosis, or edema  NEURO: AAOx3, non-focal  SKIN:  purpuric, erythematous, macular moribilliform rash on thigh and groin area b/l, neg vesicles     LABS:                        12.6   7.60  )-----------( 422      ( 16 Dec 2018 10:58 )             37.3     12-16    131<L>  |  94<L>  |  16  ----------------------------<  107<H>  3.8   |  23  |  1.01    Ca    8.5      16 Dec 2018 07:31  Phos  1.9     12-16  Mg     2.1     12-16    TPro  7.0  /  Alb  3.7  /  TBili  0.4  /  DBili  x   /  AST  21  /  ALT  65<H>  /  AlkPhos  68  12-16    Microbiology;  CSF PCR Result: NotDetec: The meningitis/encephalitis (ME) panel (CSFPCR) is a PCR based assay that  screens for: Escherichia coli; Haemophilus influenzae; Listeria  monocytogenes; Neisseria meningitidis; Streptococcus agalactiae;  Streptococcus pneumoniae; CMV; Enterovirus; HSV-1; HSV-2; Human  herpesvirus 6; Parechovirus; VZV and Cryptococcus. Result should be  interpreted in context of clinical presentation, imaging and other lab  tests. Positive predictive value may be lower in patients with normal CSF  chemistry and cell count. (12.15.18 @ 04:46)      Consultant(s) Notes Reviewed: ID Froylan Patricia, PGY1  Pager: [phone]  After 7: Night Float pager  Alternate contact:     Patient is a 43y old  Male who presents with a chief complaint of meningitis (16 Dec 2018 18:20)    SUBJECTIVE / OVERNIGHT EVENTS: No acute event overnight. Pt was seen and examined in AM. Complains of mild headache (dull, bilateral, tingling sensation on the forehead, non-radiating, 3/10, headache is improving overall), and a newly developed itchy rash on the thigh/groin area b/l. Denies fever, chills, SOB, chest pain, n/v, diarrhea/constipation.     MEDICATIONS  (STANDING):  cefTRIAXone   IVPB 2 Gram(s) IV Intermittent every 12 hours  sodium chloride 0.9%. 1000 milliLiter(s) (100 mL/Hr) IV Continuous <Continuous>  vancomycin  IVPB 1000 milliGRAM(s) IV Intermittent every 8 hours    MEDICATIONS  (PRN):  acetaminophen   Tablet .. 650 milliGRAM(s) Oral every 6 hours PRN Temp greater or equal to 38C (100.4F)  acetaminophen   Tablet .. 650 milliGRAM(s) Oral every 6 hours PRN Moderate Pain (4 - 6)  ketorolac   Injectable 15 milliGRAM(s) IV Push every 8 hours PRN Severe Pain (7 - 10)      Vital Signs Last 24 Hrs  T(C): 37.1 (17 Dec 2018 04:47), Max: 37.7 (16 Dec 2018 09:31)  T(F): 98.8 (17 Dec 2018 04:47), Max: 99.8 (16 Dec 2018 09:31)  HR: 58 (17 Dec 2018 04:47) (55 - 64)  BP: 115/75 (17 Dec 2018 04:47) (115/75 - 129/77)  BP(mean): --  RR: 16 (17 Dec 2018 04:47) (16 - 18)  SpO2: 98% (17 Dec 2018 04:47) (95% - 98%)  CAPILLARY BLOOD GLUCOSE        I&O's Summary    16 Dec 2018 07:01  -  17 Dec 2018 07:00  --------------------------------------------------------  IN: 3330 mL / OUT: 3100 mL / NET: 230 mL      PHYSICAL EXAM  GENERAL: NAD, lying comfortably in bed in dark, warm compresses on forehead  HEAD:  Atraumatic, Normocephalic  EYES: EOMI, PERRLA, conjunctiva and sclera clear  NECK: Supple, No JVD  CHEST/LUNG: Clear to auscultation bilaterally; No wheeze  HEART: Regular rate and rhythm; No murmurs, rubs, or gallops  ABDOMEN: Soft, Nontender, Nondistended; Bowel sounds present  EXTREMITIES:  2+ Peripheral Pulses, No clubbing, cyanosis, or edema  NEURO: AAOx3, non-focal  SKIN:  purpuric, erythematous, macular moribilliform rash on thigh and groin area b/l, neg vesicles     LABS:                        12.6   7.60  )-----------( 422      ( 16 Dec 2018 10:58 )             37.3     12-16    131<L>  |  94<L>  |  16  ----------------------------<  107<H>  3.8   |  23  |  1.01    Ca    8.5      16 Dec 2018 07:31  Phos  1.9     12-16  Mg     2.1     12-16    TPro  7.0  /  Alb  3.7  /  TBili  0.4  /  DBili  x   /  AST  21  /  ALT  65<H>  /  AlkPhos  68  12-16    Microbiology;  CSF PCR Result: NotDetec: The meningitis/encephalitis (ME) panel (CSFPCR) is a PCR based assay that  screens for: Escherichia coli; Haemophilus influenzae; Listeria  monocytogenes; Neisseria meningitidis; Streptococcus agalactiae;  Streptococcus pneumoniae; CMV; Enterovirus; HSV-1; HSV-2; Human  herpesvirus 6; Parechovirus; VZV and Cryptococcus. Result should be  interpreted in context of clinical presentation, imaging and other lab  tests. Positive predictive value may be lower in patients with normal CSF  chemistry and cell count. (12.15.18 @ 04:46)      Consultant(s) Notes Reviewed: ID

## 2018-12-18 DIAGNOSIS — A87.9 VIRAL MENINGITIS, UNSPECIFIED: ICD-10-CM

## 2018-12-18 LAB
4/8 RATIO: 2.5 RATIO — SIGNIFICANT CHANGE UP (ref 0.9–3.6)
ABS CD8: 673 /UL — SIGNIFICANT CHANGE UP (ref 142–740)
ACE SERPL-CCNC: 32 U/L — SIGNIFICANT CHANGE UP (ref 14–82)
ANION GAP SERPL CALC-SCNC: 12 MMOL/L — SIGNIFICANT CHANGE UP (ref 5–17)
BUN SERPL-MCNC: 9 MG/DL — SIGNIFICANT CHANGE UP (ref 7–23)
CALCIUM SERPL-MCNC: 8.8 MG/DL — SIGNIFICANT CHANGE UP (ref 8.4–10.5)
CD3 BLASTS SPEC-ACNC: 3063 /UL — HIGH (ref 672–1870)
CD3 BLASTS SPEC-ACNC: 88 % — HIGH (ref 59–83)
CD4 %: 48 % — SIGNIFICANT CHANGE UP (ref 30–62)
CD8 %: 19 % — SIGNIFICANT CHANGE UP (ref 12–36)
CHLORIDE SERPL-SCNC: 100 MMOL/L — SIGNIFICANT CHANGE UP (ref 96–108)
CMV DNA CSF QL NAA+PROBE: DETECTED
CO2 SERPL-SCNC: 25 MMOL/L — SIGNIFICANT CHANGE UP (ref 22–31)
CREAT SERPL-MCNC: 0.97 MG/DL — SIGNIFICANT CHANGE UP (ref 0.5–1.3)
CSF COMMENTS: SIGNIFICANT CHANGE UP
CSF PCR RESULT: DETECTED
GAMMA INTERFERON BACKGROUND BLD IA-ACNC: 0.02 IU/ML — SIGNIFICANT CHANGE UP
GLUCOSE SERPL-MCNC: 94 MG/DL — SIGNIFICANT CHANGE UP (ref 70–99)
HCT VFR BLD CALC: 41.2 % — SIGNIFICANT CHANGE UP (ref 39–50)
HGB BLD-MCNC: 13.9 G/DL — SIGNIFICANT CHANGE UP (ref 13–17)
HIV-1 VIRAL LOAD RESULT: SIGNIFICANT CHANGE UP
HIV1 RNA # SERPL NAA+PROBE: SIGNIFICANT CHANGE UP
HIV1 RNA SER-IMP: SIGNIFICANT CHANGE UP
HIV1 RNA SERPL NAA+PROBE-ACNC: SIGNIFICANT CHANGE UP
HIV1 RNA SERPL NAA+PROBE-LOG#: SIGNIFICANT CHANGE UP LG COP/ML
M TB IFN-G BLD-IMP: NEGATIVE — SIGNIFICANT CHANGE UP
M TB IFN-G CD4+ BCKGRND COR BLD-ACNC: 0.01 IU/ML — SIGNIFICANT CHANGE UP
M TB IFN-G CD4+CD8+ BCKGRND COR BLD-ACNC: 0.01 IU/ML — SIGNIFICANT CHANGE UP
MAGNESIUM SERPL-MCNC: 2.2 MG/DL — SIGNIFICANT CHANGE UP (ref 1.6–2.6)
MCHC RBC-ENTMCNC: 28.6 PG — SIGNIFICANT CHANGE UP (ref 27–34)
MCHC RBC-ENTMCNC: 33.7 GM/DL — SIGNIFICANT CHANGE UP (ref 32–36)
MCV RBC AUTO: 84.8 FL — SIGNIFICANT CHANGE UP (ref 80–100)
NIGHT BLUE STAIN TISS: SIGNIFICANT CHANGE UP
PHOSPHATE SERPL-MCNC: 3.6 MG/DL — SIGNIFICANT CHANGE UP (ref 2.5–4.5)
PLATELET # BLD AUTO: 384 K/UL — SIGNIFICANT CHANGE UP (ref 150–400)
POTASSIUM SERPL-MCNC: 3.5 MMOL/L — SIGNIFICANT CHANGE UP (ref 3.5–5.3)
POTASSIUM SERPL-SCNC: 3.5 MMOL/L — SIGNIFICANT CHANGE UP (ref 3.5–5.3)
QUANT TB PLUS MITOGEN MINUS NIL: 5.18 IU/ML — SIGNIFICANT CHANGE UP
RBC # BLD: 4.86 M/UL — SIGNIFICANT CHANGE UP (ref 4.2–5.8)
RBC # FLD: 13.3 % — SIGNIFICANT CHANGE UP (ref 10.3–14.5)
SODIUM SERPL-SCNC: 137 MMOL/L — SIGNIFICANT CHANGE UP (ref 135–145)
SPECIMEN SOURCE: SIGNIFICANT CHANGE UP
T-CELL CD4 SUBSET PNL BLD: 1683 /UL — HIGH (ref 489–1457)
VANCOMYCIN TROUGH SERPL-MCNC: 11.6 UG/ML — SIGNIFICANT CHANGE UP (ref 10–20)
WBC # BLD: 8.76 K/UL — SIGNIFICANT CHANGE UP (ref 3.8–10.5)
WBC # FLD AUTO: 8.76 K/UL — SIGNIFICANT CHANGE UP (ref 3.8–10.5)

## 2018-12-18 RX ORDER — VALGANCICLOVIR 450 MG/1
900 TABLET, FILM COATED ORAL EVERY 12 HOURS
Qty: 0 | Refills: 0 | Status: DISCONTINUED | OUTPATIENT
Start: 2018-12-18 | End: 2018-12-20

## 2018-12-18 RX ADMIN — CEFTRIAXONE 100 GRAM(S): 500 INJECTION, POWDER, FOR SOLUTION INTRAMUSCULAR; INTRAVENOUS at 05:13

## 2018-12-18 RX ADMIN — Medication 250 MILLIGRAM(S): at 06:35

## 2018-12-18 RX ADMIN — Medication 1 APPLICATION(S): at 17:23

## 2018-12-18 RX ADMIN — VALGANCICLOVIR 900 MILLIGRAM(S): 450 TABLET, FILM COATED ORAL at 17:24

## 2018-12-18 RX ADMIN — Medication 1 APPLICATION(S): at 05:13

## 2018-12-18 NOTE — PROGRESS NOTE ADULT - ASSESSMENT
43M previously healthy was admitted 12/14/18 for lymphocytic meningitis,  His meningitis has been of 3 weeks in duration and is lymphocytic. 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

## 2018-12-18 NOTE — PROGRESS NOTE ADULT - SUBJECTIVE AND OBJECTIVE BOX
Follow Up:  lymphocytic meningitis    Interval History/ROS: some subjective improvement, right hip area rash has not expanded    Allergies  No Known Allergies    ANTIMICROBIALS:  valGANciclovir 900 every 12 hours    OTHER MEDS:  MEDICATIONS  (STANDING):  acetaminophen   Tablet .. 650 every 6 hours PRN  acetaminophen   Tablet .. 650 every 6 hours PRN  melatonin 3 at bedtime    Vital Signs Last 24 Hrs  T(C): 36.8 (18 Dec 2018 05:09), Max: 37.1 (17 Dec 2018 14:51)  T(F): 98.3 (18 Dec 2018 05:09), Max: 98.7 (17 Dec 2018 14:51)  HR: 80 (18 Dec 2018 05:09) (55 - 80)  BP: 104/72 (18 Dec 2018 05:09) (104/72 - 110/72)  BP(mean): --  RR: 16 (18 Dec 2018 05:09) (16 - 16)  SpO2: 93% (18 Dec 2018 05:09) (93% - 99%)    PHYSICAL EXAM:  General: WN/WD NAD, Non-toxic  Neurology: A&Ox3, nonfocal  Respiratory: Clear to auscultation bilaterally  CV: RRR, S1S2, no murmurs, rubs or gallops  Abdominal: Soft, Non-tender, non-distended  Extremities: No edema,  Line Sites: Clear  Skin: rash in right hip -interdermal bleeding                        13.9   8.76  )-----------( 384      ( 18 Dec 2018 08:12 )             41.2   WBC Count: 8.76 (12-18 @ 08:12)  WBC Count: 8.58 (12-17 @ 09:12)  WBC Count: 7.60 (12-16 @ 10:58)  WBC Count: 10.28 (12-15 @ 09:18)  WBC Count: 12.2 (12-14 @ 18:41)    12-18    137  |  100  |  9   ----------------------------<  94  3.5   |  25  |  0.97    Ca    8.8      18 Dec 2018 05:51  Phos  3.6     12-18  Mg     2.2     12-18      Cerebrospinal Fluid Cell Count-1 (12.17.18 @ 20:47)    Total Nucleated Cell Count, CSF: 430 /uL    CSF Segmented Neutrophils: 4 %    Atypical Lymphocytes - CSF: 3 %    CSF Color: No Color    CSF Appearance: Clear    CSF Lymphocytes: 82 %    CSF Monocytes/Macrophages: 8 %  Glucose, CSF (12.17.18 @ 20:47)    Glucose, CSF: 40 mg/dL  Protein, CSF (12.17.18 @ 20:47)    Protein, CSF: 96 mg/dL      CSF PCR Panel (12.18.18 @ 00:54)    CSF PCR Result: Detected: The meningitis/encephalitis (ME) panel (CSFPCR) is a PCR based assay that  screens for: Escherichia coli; Haemophilus influenzae; Listeria  monocytogenes; Neisseria meningitidis; Streptococcus agalactiae;  Streptococcus pneumoniae; CMV; Enterovirus; HSV-1; HSV-2; Human  herpesvirus 6; Parechovirus; VZV and Cryptococcus. Result should be  interpreted in context of clinical presentation, imaging and other lab  tests. Positive predictive value may be lower in patients with normal CSF  chemistry and cell count.    Cytomegalovirus: Detected      MICROBIOLOGY:  .CSF CSF  12-17-18 --  --    polymorphonuclear leukocytes seen  No organisms seen  by cytocentrifuge      .CSF  12-15-18 --  --  --      .CSF CSF  12-15-18   No growth  --    polymorphonuclear leukocytes seen  No organisms seen  by cytocentrifuge      .Blood Blood  12-14-18   No growth to date.  --  --      .Blood Blood  12-14-18   No growth to date.  --  --      .Urine Clean Catch (Midstream)  12-14-18   No growth  --  --      Vancomycin Level, Trough: 11.6 ug/mL (12-18-18 @ 05:51)  HIV-1 RNA Quantitative, Viral Load (12.16.18 @ 18:21)    HIV-1 Viral Load Result: NOT DET.    HIV-1 RNA Quantitative, Viral Load:   NOT DET.    HIV-1 RNA Quantitative, Vir Load Interp: See Comment Viral loads lower than 12 copies/ml cannot be detected reliably. Thus, a  Not Detected result does not necessarily rule out HIV-1 infection.  METHOD: Transcription mediated amplification (TMA) – M:[employer].  Abbreviation:  NOT DET. and not det. = Not Detected  n/a = not available  .    HIV-1 RNA Quantitative, Viral Load Log: NOT DET. lg /mL    Quantiferon Plus TB (12.16.18 @ 14:38)    Quantiferon TB Plus: NEGATIVE: The magnitude of the measured IFN gamma level cannot be correlated to  stage or degree of Infection, level of immune responsiveness, or  likelihood for progression to active disease.  Negative means no interferon-gamma response to M. tuberculosis antigens  was detected. Infection with M. tuberculosis is NOT likely.  Positive means interferon-gamma response to M. tuberculosis antigens was  detected suggesting infection with M. tuberculosis. False positive  results may occur in patients with prior infection with M. marinum, M.  szulgai or M. kansasii.  Indeterminate means likelihood for M. tuberculosis infection is uncertain.  The results of this test cannot be used alone to make a diagnosis of  active or latent tuberculosis infection. To make either diagnosis, the  patient’s entire medical presentation must be considered. For further  information on the interpretation of this test refer to  (http://www.cdc.gov/nchstp/tb/).    Quantiferon TB Plus Nil: 0.02 IU/mL    Quantiferon TB Plus TB1 minus Nil: 0.01 IU/mL    Quantiferon TB Plus TB2 minus Nil: 0.01 IU/mL    Quant TB Plus Mitogen minus Nil: 5.18 IU/mL      HIV-1 RNA Quantitative, Viral Load Log: NOT DET. lg /mL (12-16-18 @ 18:21)    Rapid RVP Result: NotDetec (12-14 @ 18:41)        RADIOLOGY:    Marlon Garcia MD; Division of Infectious Disease; Pager: [phone]; nights and weekends: [phone]

## 2018-12-18 NOTE — PROGRESS NOTE ADULT - ASSESSMENT
43M with no significant PMHx who presents with fevers and headaches of 2 week duration, s/p LP on 12/14 and 12/17, admitted for viral meningitis. CSF PCR was initially negative, repeated study was positive for CMV. Currently afebrile, hemodynamically stable, IV abx was discontinued, and pt is started on Ganciclovir 43M with no significant PMHx who presents with fevers and headaches of 2 week duration, s/p LP on 12/14 and 12/17, admitted for viral meningitis. CSF PCR was initially negative, repeated study was positive for CMV. Currently afebrile, hemodynamically stable, IV abx was discontinued, and pt is started on vanciclovir

## 2018-12-18 NOTE — PROGRESS NOTE ADULT - PROBLEM SELECTOR PLAN 2
- leukocytosis (12.2), febrile to 103, HR >90, lactate wnl.  - sepsis now resolved: no leukocytosis, vitals are stable   - CT chest negative, RVP negative, UA negative   - c/w Ganciclovir

## 2018-12-18 NOTE — PROGRESS NOTE ADULT - PROBLEM SELECTOR PLAN 3
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  - start triamcinolone 0.1% cream bid to affected areas.

## 2018-12-18 NOTE — PROGRESS NOTE ADULT - PROBLEM SELECTOR PLAN 1
- headache + fever of 2 week duration, also noted to meningeal signs of admission  - CTH negative   - s/p LP on 12/14 in the ED: CSF with glucose 98, Protein 120, Total Nucleated Cell 1460 with segmented neutrophils (25), results are not consistent with bacterial (neutrophils count is lower than expected),  - repeated LP on 12/17: CSF PCR was positive for CMV, total cell count 430, glucose: 40, protein: 96   - ID rec appreciated: start on Ganciclovir - headache + fever of 2 week duration, also noted to meningeal signs of admission  - CTH negative   - s/p LP on 12/14 in the ED: CSF with glucose 98, Protein 120, Total Nucleated Cell 1460 with segmented neutrophils (25), results are not consistent with bacterial (neutrophils count is lower than expected),  - repeated LP on 12/17: CSF PCR was positive for CMV, total cell count 430, glucose: 40, protein: 96   - ID rec appreciated: started Ganciclovir  - T cell subsets ordered - headache + fever of 2 week duration, also noted to meningeal signs of admission  - CTH negative   - s/p LP on 12/14 in the ED: CSF with glucose 98, Protein 120, Total Nucleated Cell 1460 with segmented neutrophils (25), results are not consistent with bacterial (neutrophils count is lower than expected),  - repeated LP on 12/17: CSF PCR was positive for CMV, total cell count 430, glucose: 40, protein: 96   - ID rec appreciated: started Vanciclovir  - T cell subsets ordered

## 2018-12-18 NOTE — PROGRESS NOTE ADULT - SUBJECTIVE AND OBJECTIVE BOX
Froylan Patricia, PGY1  Pager: [phone]  After 7: Night Float pager  Alternate contact:     Patient is a 43y old  Male who presents with a chief complaint of meningitis (17 Dec 2018 14:46)      SUBJECTIVE: s/p LP yesterday afternoon, tolerated procedure well, no worsening headache, no newly developed focal deficit. Pt was seen and examined in AM,  c/o mild headache and mild neck stiffness. Denies fever, chills, chest pain, SOB, n/v, diarrhea/constipation.     MEDICATIONS  (STANDING):  melatonin 3 milliGRAM(s) Oral at bedtime  sodium chloride 0.9%. 1000 milliLiter(s) (100 mL/Hr) IV Continuous <Continuous>  triamcinolone 0.1% Cream 1 Application(s) Topical two times a day  valGANciclovir 900 milliGRAM(s) Oral every 12 hours    MEDICATIONS  (PRN):  acetaminophen   Tablet .. 650 milliGRAM(s) Oral every 6 hours PRN Temp greater or equal to 38C (100.4F)  acetaminophen   Tablet .. 650 milliGRAM(s) Oral every 6 hours PRN Moderate Pain (4 - 6)      Vital Signs Last 24 Hrs  T(C): 36.8 (18 Dec 2018 05:09), Max: 37.1 (17 Dec 2018 14:51)  T(F): 98.3 (18 Dec 2018 05:09), Max: 98.7 (17 Dec 2018 14:51)  HR: 80 (18 Dec 2018 05:09) (55 - 80)  BP: 104/72 (18 Dec 2018 05:09) (104/72 - 110/72)  BP(mean): --  RR: 16 (18 Dec 2018 05:09) (16 - 16)  SpO2: 93% (18 Dec 2018 05:09) (93% - 99%)  CAPILLARY BLOOD GLUCOSE      I&O's Summary    17 Dec 2018 07:01  -  18 Dec 2018 07:00  --------------------------------------------------------  IN: 1050 mL / OUT: 1150 mL / NET: -100 mL    PHYSICAL EXAM:  GENERAL: NAD, lying comfortably in bed in dark, warm compresses on forehead  HEAD:  Atraumatic, Normocephalic  EYES: EOMI, PERRLA, conjunctiva and sclera clear  NECK: Supple, No JVD  CHEST/LUNG: Clear to auscultation bilaterally; No wheeze  HEART: Regular rate and rhythm; No murmurs, rubs, or gallops  ABDOMEN: Soft, Nontender, Nondistended; Bowel sounds present  EXTREMITIES:  2+ Peripheral Pulses, No clubbing, cyanosis, or edema  NEURO: AAOx3, non-focal  SKIN:  purpuric, erythematous, macular moribilliform rash on thigh and groin area b/l, neg vesicles     LABS:                        13.9   8.76  )-----------( 384      ( 18 Dec 2018 08:12 )             41.2     12-18    137  |  100  |  9   ----------------------------<  94  3.5   |  25  |  0.97    Ca    8.8      18 Dec 2018 05:51  Phos  3.6     12-18  Mg     2.2     12-18    Microbiology;    Culture - CSF with Gram Stain (collected 12-17-18 @ 22:19)  Source: .CSF  Gram Stain (12-17-18 @ 23:05):    polymorphonuclear leukocytes seen    No organisms seen    by cytocentrifuge    CSF PCR Panel (12.18.18 @ 00:54)    CSF PCR Result: Detected: The meningitis/encephalitis (ME) panel (CSFPCR) is a PCR based assay that  screens for: Escherichia coli; Haemophilus influenzae; Listeria  monocytogenes; Neisseria meningitidis; Streptococcus agalactiae;  Streptococcus pneumoniae; CMV; Enterovirus; HSV-1; HSV-2; Human  herpesvirus 6; Parechovirus; VZV and Cryptococcus. Result should be  interpreted in context of clinical presentation, imaging and other lab  tests. Positive predictive value may be lower in patients with normal CSF  chemistry and cell count.    Cytomegalovirus: Detected    Glucose, CSF (12.17.18 @ 20:47)    Glucose, CSF: 40 mg/dL  Protein, CSF (12.17.18 @ 20:47)    Protein, CSF: 96 mg/dL       RADIOLOGY & ADDITIONAL TESTS: none       Consultant(s) Notes Reviewed:  ID

## 2018-12-19 ENCOUNTER — TRANSCRIPTION ENCOUNTER (OUTPATIENT)
Age: 43
End: 2018-12-19

## 2018-12-19 DIAGNOSIS — L27.0 GENERALIZED SKIN ERUPTION DUE TO DRUGS AND MEDICAMENTS TAKEN INTERNALLY: ICD-10-CM

## 2018-12-19 LAB
ANA TITR SER: NEGATIVE — SIGNIFICANT CHANGE UP
ANION GAP SERPL CALC-SCNC: 12 MMOL/L — SIGNIFICANT CHANGE UP (ref 5–17)
BUN SERPL-MCNC: 11 MG/DL — SIGNIFICANT CHANGE UP (ref 7–23)
CALCIUM SERPL-MCNC: 9.1 MG/DL — SIGNIFICANT CHANGE UP (ref 8.4–10.5)
CHLORIDE SERPL-SCNC: 100 MMOL/L — SIGNIFICANT CHANGE UP (ref 96–108)
CMV DNA CSF QL NAA+PROBE: 224 — SIGNIFICANT CHANGE UP
CMV DNA SPEC NAA+PROBE-LOG#: 2.35 LOGIU/ML — HIGH
CO2 SERPL-SCNC: 25 MMOL/L — SIGNIFICANT CHANGE UP (ref 22–31)
CREAT SERPL-MCNC: 0.94 MG/DL — SIGNIFICANT CHANGE UP (ref 0.5–1.3)
CULTURE RESULTS: SIGNIFICANT CHANGE UP
CULTURE RESULTS: SIGNIFICANT CHANGE UP
DSDNA AB SER-ACNC: <12 IU/ML — SIGNIFICANT CHANGE UP
GLUCOSE SERPL-MCNC: 97 MG/DL — SIGNIFICANT CHANGE UP (ref 70–99)
HCT VFR BLD CALC: 40.9 % — SIGNIFICANT CHANGE UP (ref 39–50)
HGB BLD-MCNC: 13.6 G/DL — SIGNIFICANT CHANGE UP (ref 13–17)
LEPTOSPIRA AB TITR SER: NEGATIVE — SIGNIFICANT CHANGE UP
MAGNESIUM SERPL-MCNC: 2.3 MG/DL — SIGNIFICANT CHANGE UP (ref 1.6–2.6)
MCHC RBC-ENTMCNC: 28.3 PG — SIGNIFICANT CHANGE UP (ref 27–34)
MCHC RBC-ENTMCNC: 33.3 GM/DL — SIGNIFICANT CHANGE UP (ref 32–36)
MCV RBC AUTO: 85 FL — SIGNIFICANT CHANGE UP (ref 80–100)
PHOSPHATE SERPL-MCNC: 3.4 MG/DL — SIGNIFICANT CHANGE UP (ref 2.5–4.5)
PLATELET # BLD AUTO: 396 K/UL — SIGNIFICANT CHANGE UP (ref 150–400)
POTASSIUM SERPL-MCNC: 3.9 MMOL/L — SIGNIFICANT CHANGE UP (ref 3.5–5.3)
POTASSIUM SERPL-SCNC: 3.9 MMOL/L — SIGNIFICANT CHANGE UP (ref 3.5–5.3)
RBC # BLD: 4.81 M/UL — SIGNIFICANT CHANGE UP (ref 4.2–5.8)
RBC # FLD: 13.7 % — SIGNIFICANT CHANGE UP (ref 10.3–14.5)
SODIUM SERPL-SCNC: 137 MMOL/L — SIGNIFICANT CHANGE UP (ref 135–145)
SPECIMEN SOURCE: SIGNIFICANT CHANGE UP
SPECIMEN SOURCE: SIGNIFICANT CHANGE UP
WBC # BLD: 8.47 K/UL — SIGNIFICANT CHANGE UP (ref 3.8–10.5)
WBC # FLD AUTO: 8.47 K/UL — SIGNIFICANT CHANGE UP (ref 3.8–10.5)

## 2018-12-19 RX ADMIN — VALGANCICLOVIR 900 MILLIGRAM(S): 450 TABLET, FILM COATED ORAL at 06:18

## 2018-12-19 RX ADMIN — Medication 1 APPLICATION(S): at 17:33

## 2018-12-19 RX ADMIN — VALGANCICLOVIR 900 MILLIGRAM(S): 450 TABLET, FILM COATED ORAL at 17:34

## 2018-12-19 NOTE — DIETITIAN INITIAL EVALUATION ADULT. - ENERGY NEEDS
Ht: 74 inches Wt: 225 pounds(12/15) BMI: 29.0 kg/m2 IBW: 190 pounds(+/-10%) %%  no edema. no pressure ulcers documented.  Other Pertinent Information: Per chart, 42 y/o male presenting with intermittent fevers and headache for the past two weeks. Pt with meningitis and groin rash from antibiotic interaction, sepsis has been resolved.. Different antibiotics were started and pt had repeat lumbar puncture on 12/17, + for cytomegalovirus. Ht: 74 inches Wt: 225 pounds(12/15) BMI: 29.0 kg/m2 IBW: 190 pounds(+/-10%) %%  no edema. no pressure ulcers documented.  Other Pertinent Information: Per chart, 44 y/o male presenting with fevers and headaches for the past two weeks. Pt with meningitis and with groin rash caused by antibiotic interaction, sepsis has been resolved. Different antibiotics were started and pt had repeat lumbar puncture on 12/17, + for cytomegalovirus.

## 2018-12-19 NOTE — DIETITIAN INITIAL EVALUATION ADULT. - ORAL INTAKE PTA
good/Pt states he had good PO intake PTA, denies food allergies and did not take any supplements PTA.

## 2018-12-19 NOTE — PROGRESS NOTE ADULT - SUBJECTIVE AND OBJECTIVE BOX
Follow Up: Aseptic Meningitis    Interval History/ROS: Much improved headache after starting Valcyte. No more fevers. No vision changes. Appetite improved.   Has an itchy rash to his groin that is getting better with topical steroids.     Allergies  No Known Allergies    ANTIMICROBIALS:  valGANciclovir 900 every 12 hours      OTHER MEDS:  acetaminophen   Tablet .. 650 milliGRAM(s) Oral every 6 hours PRN  acetaminophen   Tablet .. 650 milliGRAM(s) Oral every 6 hours PRN  melatonin 3 milliGRAM(s) Oral at bedtime  sodium chloride 0.9%. 1000 milliLiter(s) IV Continuous <Continuous>  triamcinolone 0.1% Cream 1 Application(s) Topical two times a day      Vital Signs Last 24 Hrs  T(C): 36.6 (19 Dec 2018 14:38), Max: 36.8 (18 Dec 2018 21:00)  T(F): 97.9 (19 Dec 2018 14:38), Max: 98.3 (18 Dec 2018 21:00)  HR: 69 (19 Dec 2018 14:38) (64 - 69)  BP: 118/78 (19 Dec 2018 14:38) (107/71 - 118/78)  BP(mean): --  RR: 18 (19 Dec 2018 14:38) (18 - 20)  SpO2: 98% (19 Dec 2018 14:38) (95% - 98%)    Physical Exam:  General: NAD,  non toxic, A&O x 3  HEENT:  no oropharyngeal lesions,   no LAD,   neck supple  Cardiovascular: regular rate and rhythm   Respiratory:  nonlabored, clear bilaterally, no wheezing  abd:  soft, bowel sounds present, nontender, no organomegaly  :   no CVAT,  no suprapubic tenderness,   no  cronin  Musculoskeletal:   no joint swelling  Skin:  petechial rash to both groins, no cellulitis   Neurologic:     no focal deficit   Vascular: no phlebitis, no edema   Psych: normal affect                           13.6   8.47  )-----------( 396      ( 19 Dec 2018 08:07 )             40.9       12-19    137  |  100  |  11  ----------------------------<  97  3.9   |  25  |  0.94    Ca    9.1      19 Dec 2018 06:26  Phos  3.4     12-19  Mg     2.3     12-19    MICROBIOLOGY:  Culture - CSF with Gram Stain (12.17.18 @ 22:19)    Gram Stain:   polymorphonuclear leukocytes seen  No organisms seen  by cytocentrifuge    Specimen Source: .CSF    Culture Results:   No growth    CSF PCR Panel (12.18.18 @ 00:54)    Cytomegalovirus: Detected    Culture - Acid Fast - CSF . (12.17.18 @ 22:19)    Specimen Source: .CSF CSF    Acid Fast Bacilli Smear:   Less than 5 cc of CSF received,  unable to perform AFB smear.    Culture - Acid Fast - CSF (12.15.18 @ 01:58)    Specimen Source: .CSF    Acid Fast Bacilli Smear:   Less than 5 cc of CSF received,  unable to perform AFB smear.    Culture - CSF with Gram Stain . (12.15.18 @ 00:20)    Gram Stain:   polymorphonuclear leukocytes seen  No organisms seen  by cytocentrifuge    Specimen Source: .CSF CSF    Culture Results:   No growth    Culture - Blood (12.14.18 @ 22:48)    Specimen Source: .Blood Blood    Culture Results:   No growth to date.    Culture - Blood (12.14.18 @ 22:47)    Specimen Source: .Blood Blood    Culture Results:   No growth to date.    Rapid RVP Result: NotDetec (12-14 @ 18:41)    RADIOLOGY:  CT Chest No Cont (12.14.18 @ 19:42)   CHEST:   LUNGS AND LARGE AIRWAYS: Patent central airways.  Right middle lobe and   left lower lobe subsegmental atelectasis. No pulmonary nodules.  PLEURA: No pleural effusion.  VESSELS: Within normal limits.  HEART: Heart size is normal. No pericardial effusion.  MEDIASTINUM AND MILO: Few scattered, mildly enlarged mediastinal lymph   nodes; for reference, a 1.8 x 1.1 cm right pretracheal node (series 3, image 165).  CHEST WALL AND LOWER NECK: Within normal limits.  VISUALIZED UPPER ABDOMEN: Subcentimeter hypodensity in the left lobe liver.   BONES: Within normal limits.    CT Head No Cont (12.14.18 @ 18:51)   Normal non-contrast CT of the brain.

## 2018-12-19 NOTE — DISCHARGE NOTE ADULT - PLAN OF CARE
Treatment You were admitted to the hospital for concern for meningitis. You were initially treated with antibiotics which testing was performed to determine the cause of the meningitis. 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 Please schedule a follow up with your infectious disease Dr. Garcia within 2-3 weeks [phone]. 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 Please continue the steroid cream (triamcinalone cream) for 1-2 weeks. Please follow up with dermatology if there is no resolution in symptoms with 2 weeks.  Dr. Cleve Hayes or any other provider in dermatology clinic. [phone]

## 2018-12-19 NOTE — DISCHARGE NOTE ADULT - ADDITIONAL INSTRUCTIONS
Please schedule a follow up with your infectious disease Dr. Garcia within 2-3 weeks [phone].

## 2018-12-19 NOTE — DISCHARGE NOTE ADULT - CARE PROVIDER_API CALL
Marlon Garcia), Geriatric Medicine; Infectious Disease; Internal Medicine  [address] [place] [address]  Phone: [phone]  Fax: [phone]

## 2018-12-19 NOTE — PROGRESS NOTE ADULT - ATTENDING COMMENTS
Patient seen and examined with DR. Steven  I agree with his interval history exam findings and plans as noted above  Patient with meningitis with CMV + CSF pcr- symptoms improving on oral Velia ganciclovir  Exam with subtle neck, clear lungs, soft abdomen    A/P  It is very unusual for an immune competent host to be diagnosed with CMV meningitis   Would send serologies and serum CMV PCR  optho for retinal exam although patient without visual complaints at present time  MRI of brain    Baldev Ayala MD  [phone]  After 5pm/weekends [phone]
Patient seen and examined.  Agree with resident note.  Meds, labs and vitals all reviewed.  Patient with meningitis, etiology unclear.  Patient clinically improving, with improving fever curve and symptoms.  Remains on Ceftriaxone and Vanco.  ID is following closely.   Patient may require another LP if symptoms don't improve.
Patient seen and examined.  For full H and P, please refer to note in [place] from same date.

## 2018-12-19 NOTE — DIETITIAN INITIAL EVALUATION ADULT. - PROBLEM SELECTOR PLAN 1
- headache + fever of 2 week duration, CTH negative   - CSF with glucose 98, Protein 120, Total Nucleated Cell 1460 with segmented neutrophils (25) possibly 2/2 bacterial meningitis, however pt. has had symptoms for two weeks and lower neutrophil count than expected, f/u CSF PCR, AFB culture not sent - added on AFB   - continue with vancomycin 1500mg BID, ceftriaxone 2g BID, pt. received abx prior to steroids, no indication to initiate dexamethasone at this time  - Hep B/C antibodies, HIV screen, ID consult in AM

## 2018-12-19 NOTE — PROGRESS NOTE ADULT - PROBLEM SELECTOR PLAN 1
- headache + fever of 2 week duration, also noted to meningeal signs of admission  - CTH negative   - s/p LP on 12/14 in the ED: CSF with glucose 98, Protein 120, Total Nucleated Cell 1460 with segmented neutrophils (25), results are not consistent with bacterial (neutrophils count is lower than expected),  - repeated LP on 12/17: CSF PCR was positive for CMV, total cell count 430, glucose: 40, protein: 96, consistent with viral meningitis   - ID rec appreciated: started Vanciclovir  - T3 cell count: wnl

## 2018-12-19 NOTE — DISCHARGE NOTE ADULT - OTHER SIGNIFICANT FINDINGS
< from: CT Head No Cont (12.14.18 @ 18:51) >  FINDINGS:    VENTRICLES AND SULCI:  Normal.    INTRA-AXIAL:  No mass, blood or abnormal attenuation is seen.    EXTRA-AXIAL:  No mass or collection is seen.    VISUALIZED SINUSES:  Clear.    VISUALIZED MASTOIDS:  Clear.    CALVARIUM:  Normal.    MISCELLANEOUS:  None.    IMPRESSION:    Normal non-contrast CT of the brain.    < end of copied text >      < from: CT Chest No Cont (12.14.18 @ 19:42) >    CHEST:     LUNGS AND LARGE AIRWAYS: Patent central airways.  Right middle lobe and   left lower lobe subsegmental atelectasis. No pulmonary nodules.  PLEURA: No pleural effusion.  VESSELS: Within normal limits.  HEART: Heart size is normal. No pericardial effusion.  MEDIASTINUM AND MILO: Few scattered, mildly enlarged mediastinal lymph   nodes; for reference, a 1.8 x 1.1 cm right pretracheal node (series 3,   image 165).  CHEST WALL AND LOWER NECK: Within normal limits.  VISUALIZED UPPER ABDOMEN: Subcentimeter hypodensity in the left lobe   liver.   BONES: Within normal limits.    IMPRESSION: Bilateral subsegmental atelectasis. No pneumonia.    < end of copied text >

## 2018-12-19 NOTE — PROGRESS NOTE ADULT - SUBJECTIVE AND OBJECTIVE BOX
Froylan Patricia, PGY1  Pager: [phone]  After 7: Night Float pager  Alternate contact:     Patient is a 43y old  Male who presents with a chief complaint of meningitis (18 Dec 2018 12:58)      SUBJECTIVE / OVERNIGHT EVENTS: No acute event overnight. Pt was seen and examined in AM,  c/o mild headache. Groin rash is improved with steroid cream. Denies fever, chills, chest pain, SOB, n/v, diarrhea/constipation.     MEDICATIONS  (STANDING):  melatonin 3 milliGRAM(s) Oral at bedtime  sodium chloride 0.9%. 1000 milliLiter(s) (100 mL/Hr) IV Continuous <Continuous>  triamcinolone 0.1% Cream 1 Application(s) Topical two times a day  valGANciclovir 900 milliGRAM(s) Oral every 12 hours    MEDICATIONS  (PRN):  acetaminophen   Tablet .. 650 milliGRAM(s) Oral every 6 hours PRN Temp greater or equal to 38C (100.4F)  acetaminophen   Tablet .. 650 milliGRAM(s) Oral every 6 hours PRN Moderate Pain (4 - 6)      Vital Signs Last 24 Hrs  T(C): 36.8 (19 Dec 2018 05:49), Max: 36.8 (18 Dec 2018 21:00)  T(F): 98.3 (19 Dec 2018 05:49), Max: 98.3 (18 Dec 2018 21:00)  HR: 65 (19 Dec 2018 05:49) (64 - 81)  BP: 107/71 (19 Dec 2018 05:49) (107/71 - 110/71)  BP(mean): --  RR: 20 (19 Dec 2018 05:49) (18 - 20)  SpO2: 95% (19 Dec 2018 05:49) (95% - 98%)  CAPILLARY BLOOD GLUCOSE    I&O's Summary      PHYSICAL EXAM:      LABS:                        13.6   8.47  )-----------( 396      ( 19 Dec 2018 08:07 )             40.9     12-19    137  |  100  |  11  ----------------------------<  97  3.9   |  25  |  0.94    Ca    9.1      19 Dec 2018 06:26  Phos  3.4     12-19  Mg     2.3     12-19     Consultant(s) Notes Reviewed:  ID Froylan Patricia, PGY1  Pager: [phone]  After 7: Night Float pager  Alternate contact:     Patient is a 43y old  Male who presents with a chief complaint of meningitis (18 Dec 2018 12:58)      SUBJECTIVE / OVERNIGHT EVENTS: No acute event overnight. Pt was seen and examined in AM,  c/o mild headache. Groin rash is improved with steroid cream. Denies fever, chills, chest pain, SOB, n/v, diarrhea/constipation.     MEDICATIONS  (STANDING):  melatonin 3 milliGRAM(s) Oral at bedtime  sodium chloride 0.9%. 1000 milliLiter(s) (100 mL/Hr) IV Continuous <Continuous>  triamcinolone 0.1% Cream 1 Application(s) Topical two times a day  valGANciclovir 900 milliGRAM(s) Oral every 12 hours    MEDICATIONS  (PRN):  acetaminophen   Tablet .. 650 milliGRAM(s) Oral every 6 hours PRN Temp greater or equal to 38C (100.4F)  acetaminophen   Tablet .. 650 milliGRAM(s) Oral every 6 hours PRN Moderate Pain (4 - 6)      Vital Signs Last 24 Hrs  T(C): 36.8 (19 Dec 2018 05:49), Max: 36.8 (18 Dec 2018 21:00)  T(F): 98.3 (19 Dec 2018 05:49), Max: 98.3 (18 Dec 2018 21:00)  HR: 65 (19 Dec 2018 05:49) (64 - 81)  BP: 107/71 (19 Dec 2018 05:49) (107/71 - 110/71)  BP(mean): --  RR: 20 (19 Dec 2018 05:49) (18 - 20)  SpO2: 95% (19 Dec 2018 05:49) (95% - 98%)  CAPILLARY BLOOD GLUCOSE    I&O's Summary    PHYSICAL EXAM:  GENERAL: NAD, lying comfortably in bed in dark, warm compresses on forehead  HEAD:  Atraumatic, Normocephalic  EYES: EOMI, PERRLA, conjunctiva and sclera clear  NECK: Supple, No JVD  CHEST/LUNG: Clear to auscultation bilaterally; No wheeze  HEART: Regular rate and rhythm; No murmurs, rubs, or gallops  ABDOMEN: Soft, Nontender, Nondistended; Bowel sounds present  EXTREMITIES:  2+ Peripheral Pulses, No clubbing, cyanosis, or edema  NEURO: AAOx3, non-focal  SKIN:  purpuric, erythematous, macular moribilliform rash on thigh and groin area b/l, neg vesicles       LABS:                        13.6   8.47  )-----------( 396      ( 19 Dec 2018 08:07 )             40.9     12-19    137  |  100  |  11  ----------------------------<  97  3.9   |  25  |  0.94    Ca    9.1      19 Dec 2018 06:26  Phos  3.4     12-19  Mg     2.3     12-19       T Cell Subset (12.18.18 @ 11:13)    CD3 %: 88: Reference ranges for pediatric population (0-18 years old) are from  Sundar Choi, et al. "Lymphocyte subsets in healthy children from  birth through 18 years of age: the Pediatric AIDS Clinical Trials Group   study. "Journal of Allergy and Clinical Immunology 112.5 (2003):  973-980.  Reference range for adult (18-65 years old) and geriatric (65 years old  and above) populations are developed at [place], [place]. %    CD4 %: 48 %    CD8 %: 19 %    4/8 Ratio: 2.50 RATIO    ABS CD3: 3063 /uL    ABS CD4: 1683 /uL    ABS CD8: 673 /uL      Consultant(s) Notes Reviewed:  ID

## 2018-12-19 NOTE — DISCHARGE NOTE ADULT - HOSPITAL COURSE
HPI: 43M with no significant PMHx who presents with fevers and headaches of 2 week duration. Pt. has been having persistent fevers at home with Tmax of 102F associated with headache and neck stiffness. Headache is bilateral, and has been constant over the past two weeks, minimal relief with tylenol. He went to [facility] on 12/4 with these complaints, CTH negative, was recommended to get LP, however refused and went home. He continued to have fatigue and headaches, so was suggested by his PMD to come to the ED today. He denies hx of latent tb, cp, sob, n/v/d, dysuria, rashes, sick contacts, recent travel.  [place] from [facility] at 23 yo and visits frequently last this summer.  No h/o drug use/residential.  No h/o frequent infections/pna/past meningitis.  Pt states 3 yrs ago he had an "illness" and lost his sense of taste and smell - both slowly returning but things will smell different than they used to, has had neg work up by MD.    Hospital course: pt is admitted for viral meninigitis. HPI: 43M with no significant PMHx who presents with fevers and headaches of 2 week duration. Pt. has been having persistent fevers at home with Tmax of 102F associated with headache and neck stiffness. Headache is bilateral, and has been constant over the past two weeks, minimal relief with tylenol. He went to [facility] on 12/4 with these complaints, CTH negative, was recommended to get LP, however refused and went home. He continued to have fatigue and headaches, so was suggested by his PMD to come to the ED today. He denies hx of latent tb, cp, sob, n/v/d, dysuria, rashes, sick contacts, recent travel.  [place] [facility] at 25 yo and visits frequently last this summer.  No h/o drug use/care home.  No h/o frequent infections/pna/past meningitis.  Pt states 3 yrs ago he had an "illness" and lost his sense of taste and smell - both slowly returning but things will smell different than they used to, has had neg work up by MD.    Hospital course: pt is admitted for viral meninigitis. s/p LP in the ED on 12/14 , CSF PCR was negative, and CSF cell count and chemistries at the time was more consistent with bacterial meningitis and pt was started on broad spectrum abx. Repeated LP on 12/17, CSF PCR was positive for CMV, CSF cell count and chemistries were consistent with viral meningitis. CSF T-cell count was wnl. HIV and HepC are negative. Pt remained afebrile and hemodynamically stable throughout hospital course. He is clinically improved with resolution of the photophobia and neck stiffness, will be discharged with f/u to ID. HPI: 43M with no significant PMHx who presents with fevers and headaches of 2 week duration. Pt. has been having persistent fevers at home with Tmax of 102F associated with headache and neck stiffness. Headache is bilateral, and has been constant over the past two weeks, minimal relief with tylenol. He went to [facility] on 12/4 with these complaints, CTH negative, was recommended to get LP, however refused and went home. He continued to have fatigue and headaches, so was suggested by his PMD to come to the ED today. He denies hx of latent tb, cp, sob, n/v/d, dysuria, rashes, sick contacts, recent travel.  [place] [facility] at 25 yo and visits frequently last this summer.  No h/o drug use/penitentiary.  No h/o frequent infections/pna/past meningitis.  Pt states 3 yrs ago he had an "illness" and lost his sense of taste and smell - both slowly returning but things will smell different than they used to, has had neg work up by MD.    Hospital course: pt is admitted for viral meninigitis. s/p LP in the ED on 12/14 , CSF PCR was negative, and CSF cell count and chemistries at the time was more consistent with bacterial meningitis and pt was started on broad spectrum abx. Repeated LP on 12/17, CSF PCR was positive for CMV, CSF cell count and chemistries were consistent with viral meningitis. CSF T-cell count was wnl. HIV and HepC are negative. Pt was started on Valganciclovir on 12/18. Pt remained afebrile and hemodynamically stable throughout hospital course. He is clinically improved with resolution of the photophobia and neck stiffness, will be discharged with f/u to ID. HPI: 43M with no significant PMHx who presents with fevers and headaches of 2 week duration. Pt. has been having persistent fevers at home with Tmax of 102F associated with headache and neck stiffness. Headache is bilateral, and has been constant over the past two weeks, minimal relief with tylenol. He went to [facility] on 12/4 with these complaints, CTH negative, was recommended to get LP, however refused and went home. He continued to have fatigue and headaches, so was suggested by his PMD to come to the ED today. He denies hx of latent tb, cp, sob, n/v/d, dysuria, rashes, sick contacts, recent travel.  [place] [facility] at 23 yo and visits frequently last this summer.  No h/o drug use/long-term.  No h/o frequent infections/pna/past meningitis.  Pt states 3 yrs ago he had an "illness" and lost his sense of taste and smell - both slowly returning but things will smell different than they used to, has had neg work up by MD.    Hospital course: pt is admitted for viral meninigitis. s/p LP in the ED on 12/14 , CSF PCR was negative, and CSF cell count and chemistries at the time was more consistent with bacterial meningitis and pt was started on broad spectrum abx. Repeated LP on 12/17, CSF PCR was positive for CMV, CSF cell count and chemistries were consistent with viral meningitis. CSF T-cell count was wnl. HIV and HepC are negative. Pt was started on Valganciclovir on 12/18. Pt remained afebrile and hemodynamically stable throughout hospital course. He is clinically improved with resolution of the photophobia and neck stiffness, will be discharged with f/u to ID. To take 21 day course of valganciclovir. MRI read pending. Patient seen by ophtho in patient to r/o CMV retinitis. 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

## 2018-12-19 NOTE — DISCHARGE NOTE ADULT - MEDICATION SUMMARY - MEDICATIONS TO TAKE
I will START or STAY ON the medications listed below when I get home from the hospital:    valGANciclovir 450 mg oral tablet  -- 2 tab(s) by mouth every 12 hours  -- Indication: For Cytomegalovirus infection    triamcinolone 0.1% topical cream  -- 1 application on skin 2 times a day  -- Indication: For Rash I will START or STAY ON the medications listed below when I get home from the hospital:    valGANciclovir 450 mg oral tablet  -- 2 tab(s) by mouth every 12 hours  -- Indication: For Cytomegalovirus infection    triamcinolone 0.1% topical cream  -- 1 application on skin 2 times a day  -- Indication: For Cytomegalovirus infection

## 2018-12-19 NOTE — DISCHARGE NOTE ADULT - CARE PLAN
Principal Discharge DX:	Viral meningitis Principal Discharge DX:	Viral meningitis  Goal:	Treatment  Assessment and plan of treatment:	You were admitted to the hospital for concern for meningitis. You were initially treated with antibiotics which testing was performed to determine the cause of the meningitis. 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 Please schedule a follow up with your infectious disease DrJay Garcia within 2-3 weeks [phone].  Secondary Diagnosis:	Rash  Assessment and plan of treatment:	You were found to have a rash likely from antibiotics and were seen by dermatology and diagnosed with   symmetric drug-related intertriginous and flexural exanthem (SDRIFE). Please continue the steroid cream (triamcinalone cream) for 1-2 weeks. Please follow up with dermatology if there is no resolution in symptoms with 2 weeks.  Dr. Cleve Hayes or any other provider in dermatology clinic. [phone]

## 2018-12-19 NOTE — DIETITIAN INITIAL EVALUATION ADULT. - OTHER INFO
Pt seen for length of stay. Pt reports good appetite/ PO intake, eating >75% of his meals. Observed pt ate banana and yogurt for breakfast. Pt states that he vomited once in his first two days of admission from not feeling well and was eating minimal amounts of his meals. PO intake has improved since, and nausea/vomiting ceased. Pt denies difficulties chewing/swallowing and GI distress. Last BM was yesterday.

## 2018-12-19 NOTE — PROGRESS NOTE ADULT - ASSESSMENT
43M previously healthy was admitted 12/14/18 with lymphocytic meningitis, symptoms for 3 weeks. Initial CSF PCR negative, repeat with CMV.   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 HIV screen and viral load negative. T cells normal. Negative Quantiferon.   Rash to his groin attributed to drug reaction, improving now.     Recommend  -continue Valganciclovir 900mg PO BID   -MRI brain with and without contrast   -Ophthalmology evaluation - has no visual changes but rule out retinitis   -CMV serologies ordered for tomorrow  -f/u CMV serum PCR    Discussed with primary team

## 2018-12-19 NOTE — PROGRESS NOTE ADULT - ASSESSMENT
43M with no significant PMHx who presents with fevers and headaches of 2 week duration, s/p LP on 12/14 and 12/17, admitted for viral meningitis. CSF PCR was initially negative, repeated study was positive for CMV. Currently afebrile, hemodynamically stable, IV abx was discontinued, and currently on vanciclovir

## 2018-12-19 NOTE — DISCHARGE NOTE ADULT - PATIENT PORTAL LINK FT
You can access the [facility] Patient Portal, offered by [facility], by registering with the following website: [email or web address]

## 2018-12-19 NOTE — PROGRESS NOTE ADULT - PROBLEM SELECTOR PLAN 3
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  - c/w triamcinolone 0.1% cream bid to affected areas.

## 2018-12-20 VITALS
HEART RATE: 67 BPM | SYSTOLIC BLOOD PRESSURE: 118 MMHG | OXYGEN SATURATION: 97 % | RESPIRATION RATE: 18 BRPM | DIASTOLIC BLOOD PRESSURE: 80 MMHG | TEMPERATURE: 98 F

## 2018-12-20 DIAGNOSIS — B25.9 CYTOMEGALOVIRAL DISEASE, UNSPECIFIED: ICD-10-CM

## 2018-12-20 LAB
BASOPHILS # BLD AUTO: 0.17 K/UL — SIGNIFICANT CHANGE UP (ref 0–0.2)
BASOPHILS NFR BLD AUTO: 1.8 % — SIGNIFICANT CHANGE UP (ref 0–2)
CMV IGG FLD QL: 1 U/ML — HIGH
CMV IGG SERPL-IMP: POSITIVE
CMV IGM FLD-ACNC: <8 AU/ML — SIGNIFICANT CHANGE UP
CMV IGM SERPL QL: NEGATIVE — SIGNIFICANT CHANGE UP
EOSINOPHIL # BLD AUTO: 0.25 K/UL — SIGNIFICANT CHANGE UP (ref 0–0.5)
EOSINOPHIL NFR BLD AUTO: 2.7 % — SIGNIFICANT CHANGE UP (ref 0–6)
HCT VFR BLD CALC: 41.9 % — SIGNIFICANT CHANGE UP (ref 39–50)
HGB BLD-MCNC: 13.8 G/DL — SIGNIFICANT CHANGE UP (ref 13–17)
LYMPHOCYTES # BLD AUTO: 5 K/UL — HIGH (ref 1–3.3)
LYMPHOCYTES # BLD AUTO: 54.1 % — HIGH (ref 13–44)
MCHC RBC-ENTMCNC: 28.7 PG — SIGNIFICANT CHANGE UP (ref 27–34)
MCHC RBC-ENTMCNC: 32.9 GM/DL — SIGNIFICANT CHANGE UP (ref 32–36)
MCV RBC AUTO: 87.1 FL — SIGNIFICANT CHANGE UP (ref 80–100)
MONOCYTES # BLD AUTO: 0.67 K/UL — SIGNIFICANT CHANGE UP (ref 0–0.9)
MONOCYTES NFR BLD AUTO: 7.2 % — SIGNIFICANT CHANGE UP (ref 2–14)
NEUTROPHILS # BLD AUTO: 3.16 K/UL — SIGNIFICANT CHANGE UP (ref 1.8–7.4)
NEUTROPHILS NFR BLD AUTO: 34.2 % — LOW (ref 43–77)
PLATELET # BLD AUTO: 394 K/UL — SIGNIFICANT CHANGE UP (ref 150–400)
RBC # BLD: 4.81 M/UL — SIGNIFICANT CHANGE UP (ref 4.2–5.8)
RBC # FLD: 13.8 % — SIGNIFICANT CHANGE UP (ref 10.3–14.5)
TM INTERPRETATION: SIGNIFICANT CHANGE UP
WBC # BLD: 9.25 K/UL — SIGNIFICANT CHANGE UP (ref 3.8–10.5)
WBC # FLD AUTO: 9.25 K/UL — SIGNIFICANT CHANGE UP (ref 3.8–10.5)

## 2018-12-20 RX ORDER — VALGANCICLOVIR 450 MG/1
2 TABLET, FILM COATED ORAL
Qty: 84 | Refills: 0 | OUTPATIENT
Start: 2018-12-20 | End: 2019-01-09

## 2018-12-20 RX ADMIN — VALGANCICLOVIR 900 MILLIGRAM(S): 450 TABLET, FILM COATED ORAL at 06:39

## 2018-12-20 RX ADMIN — Medication 1 APPLICATION(S): at 17:50

## 2018-12-20 RX ADMIN — VALGANCICLOVIR 900 MILLIGRAM(S): 450 TABLET, FILM COATED ORAL at 17:49

## 2018-12-20 RX ADMIN — Medication 1 APPLICATION(S): at 06:39

## 2018-12-20 NOTE — PROGRESS NOTE ADULT - ASSESSMENT
43M with no significant PMHx who presents with fevers and headaches of 2 week duration, s/p LP on 12/14 and 12/17, admitted for viral meningitis. CSF PCR was initially negative, repeated study was positive for CMV with serum PCR positive. Currently afebrile, hemodynamically stable, IV abx was discontinued, and currently on valganciclovir

## 2018-12-20 NOTE — PROGRESS NOTE ADULT - SUBJECTIVE AND OBJECTIVE BOX
Parish Castaneda  PGY 2  Team 3  Pager [phone]    Patient is a 43y old  Male who presents with a chief complaint of meningitis (19 Dec 2018 16:03)      SUBJECTIVE / OVERNIGHT EVENTS:  Patient feeling well. No complaints overnight. Headache improving.   Otherwise denies fever, chills, chest pain, palpitations, SOB, abdominal pain, N/V, diarrhea, dysuria.     MEDICATIONS  (STANDING):  melatonin 3 milliGRAM(s) Oral at bedtime  sodium chloride 0.9%. 1000 milliLiter(s) (100 mL/Hr) IV Continuous <Continuous>  triamcinolone 0.1% Cream 1 Application(s) Topical two times a day  valGANciclovir 900 milliGRAM(s) Oral every 12 hours    MEDICATIONS  (PRN):  acetaminophen   Tablet .. 650 milliGRAM(s) Oral every 6 hours PRN Temp greater or equal to 38C (100.4F)  acetaminophen   Tablet .. 650 milliGRAM(s) Oral every 6 hours PRN Moderate Pain (4 - 6)      Vital Signs Last 24 Hrs  T(C): 36.8 (20 Dec 2018 06:08), Max: 36.8 (20 Dec 2018 06:08)  T(F): 98.3 (20 Dec 2018 06:08), Max: 98.3 (20 Dec 2018 06:08)  HR: 89 (20 Dec 2018 06:08) (69 - 89)  BP: 114/76 (20 Dec 2018 06:08) (104/64 - 118/78)  BP(mean): --  RR: 20 (20 Dec 2018 06:08) (18 - 20)  SpO2: 95% (20 Dec 2018 06:08) (95% - 98%)  CAPILLARY BLOOD GLUCOSE        I&O's Summary    19 Dec 2018 07:01  -  20 Dec 2018 07:00  --------------------------------------------------------  IN: 0 mL / OUT: 400 mL / NET: -400 mL        PHYSICAL EXAM:  GENERAL: NAD, lying comfortably in bed  HEAD:  Atraumatic, Normocephalic  EYES: EOMI, PERRLA, conjunctiva and sclera clear  NECK: Supple, No JVD  CHEST/LUNG: Clear to auscultation bilaterally; No wheeze  HEART: Regular rate and rhythm; No murmurs, rubs, or gallops  ABDOMEN: Soft, Nontender, Nondistended; Bowel sounds present  EXTREMITIES:  2+ Peripheral Pulses, No clubbing, cyanosis, or edema  NEURO: AAOx3, non-focal  SKIN:  purpuric, erythematous, macular moribilliform rash on thigh and groin area b/l, neg vesicles       LABS:                        13.8   9.25  )-----------( 394      ( 20 Dec 2018 08:14 )             41.9     12-19    137  |  100  |  11  ----------------------------<  97  3.9   |  25  |  0.94    Ca    9.1      19 Dec 2018 06:26  Phos  3.4     12-19  Mg     2.3     12-19

## 2018-12-20 NOTE — PROGRESS NOTE ADULT - PROVIDER SPECIALTY LIST ADULT
Infectious Disease
Internal Medicine

## 2018-12-20 NOTE — PROGRESS NOTE ADULT - REASON FOR ADMISSION
meningitis

## 2018-12-20 NOTE — PROGRESS NOTE ADULT - SUBJECTIVE AND OBJECTIVE BOX
Follow Up:  lymphocytic meningitis    Interval History/ROS:  while pain is resolved he has malaise, fatigued and notes dizzininess.  He has been ambulating in room. His pain was "10" on admission    Allergies  No Known Allergies    ANTIMICROBIALS:  valGANciclovir 900 every 12 hours    OTHER MEDS:  MEDICATIONS  (STANDING):  acetaminophen   Tablet .. 650 every 6 hours PRN  acetaminophen   Tablet .. 650 every 6 hours PRN  melatonin 3 at bedtime      Vital Signs Last 24 Hrs  T(C): 36.8 (20 Dec 2018 06:08), Max: 36.8 (20 Dec 2018 06:08)  T(F): 98.3 (20 Dec 2018 06:08), Max: 98.3 (20 Dec 2018 06:08)  HR: 89 (20 Dec 2018 06:08) (69 - 89)  BP: 114/76 (20 Dec 2018 06:08) (104/64 - 118/78)  BP(mean): --  RR: 20 (20 Dec 2018 06:08) (18 - 20)  SpO2: 95% (20 Dec 2018 06:08) (95% - 98%)    PHYSICAL EXAM:  General: WN/WD NAD, Non-toxic  Neurology: A&Ox3, nonfocal  Respiratory: Clear to auscultation bilaterally  CV: RRR, S1S2, no murmurs, rubs or gallops  Abdominal: Soft, Non-tender, non-distended, normal bowel sounds  Extremities: No edema  Line Sites: Clear  Skin: rash faded and much improved                        13.8   9.25  )-----------( 394      ( 20 Dec 2018 08:14 )             41.9   WBC Count: 9.25 ( @ 08:14)  WBC Count: 8.47 ( @ 08:07)  WBC Count: 8.76 ( @ 08:12)  WBC Count: 8.58 ( @ 09:12)  WBC Count: 7.60 (16 @ 10:58)        137  |  100  |  11  ----------------------------<  97  3.9   |  25  |  0.94    Ca    9.1      19 Dec 2018 06:26  Phos  3.4       Mg     2.3           MICROBIOLOGY:  .CSF CSF  18   No growth  --    polymorphonuclear leukocytes seen  No organisms seen  by cytocentrifuge      .CSF  12-15-18 --  --  --      .CSF CSF  12-15-18   No growth  --    polymorphonuclear leukocytes seen  No organisms seen  by cytocentrifuge      .Blood Blood  18   No growth at 5 days.  --  --      .Blood Blood  18   No growth at 5 days.  --  --      .Urine Clean Catch (Midstream)  18   No growth  --  --        .CSF CSF      HIV-1 RNA Quantitative, Viral Load Log: NOT DET. lg /mL (18 @ 09:18)  HIV-1 RNA Quantitative, Viral Load Log: NOT DET. lg /mL (18 @ 18:21)    CMVPCR Lo.35 LogIU/mL ( @ 12:39)  Rapid RVP Result: NotDetec ( @ 18:41)        RADIOLOGY:      Marlon Garcia MD; Division of Infectious Disease; Pager: [phone]; nights and weekends: [phone]

## 2018-12-20 NOTE — PROGRESS NOTE ADULT - PROBLEM SELECTOR PLAN 1
- headache + fever of 2 week duration, also noted to meningeal signs of admission  - CTH negative   - s/p LP on 12/14 in the ED: CSF with glucose 38, Protein 120, Total Nucleated Cell 1460 with segmented neutrophils (25), results are not consistent with bacterial (neutrophils count is lower than expected),  - repeated LP on 12/17: CSF PCR was positive for CMV, total cell count 430, glucose: 40, protein: 96, consistent with viral meningitis   - ID rec appreciated: started Valganciclovir likely 10-14 day course will clarify with ID. MRI head ordered for evaluation of leptomeninges  - T3 cell count: wnl

## 2018-12-20 NOTE — PROGRESS NOTE ADULT - PROBLEM SELECTOR PLAN 4
- DVT ppx: IMPROVE score of 0, encourage ambulation  - DIET: Regular  - Dispo: MRI to home if wnl - DVT ppx: IMPROVE score of 0, encourage ambulation  - DIET: Regular  - Dispo: MRI to home if wnl - 35 minutes spent discharging the patient.

## 2018-12-20 NOTE — CONSULT NOTE ADULT - SUBJECTIVE AND OBJECTIVE BOX
"43M with no significant PMHx who presents with fevers and headaches of 2 week duration, s/p LP on 12/14 and 12/17, admitted for viral meningitis. CSF PCR was initially negative, repeated study was positive for CMV with serum PCR positive." Ophtho consulted to r/o CMV retinitis. Pt denies vision changes, eye pain, flashes, floaters, diplopia.    PMH: None  Meds: see med rec  POcHx (including surgeries/lasers/trauma):  None  Drops: None  FamHx: None  Social Hx: None  Allergies: NKDA    ROS:  General (neg), Vision (per HPI), Head and Neck (neg), Pulm (neg), CV (neg), GI (neg),  (neg), Musculoskeletal (neg), Skin/Integ (neg), Neuro (neg), Endocrine (neg), Heme (neg), All/Immuno (neg)    Mood and Affect Appropriate ( x ),  Oriented to Time, Place, and Person x 3 ( x )    Ophthalmology Exam    Visual acuity (sc): 20/20 OU  Pupils: PERRL OU, no APD  Ttono: 18, 19  Extraocular movements (EOMs): Full OU, no pain, no diplopia   Confrontational Visual Field (CVF):  Full OU  Color Plates: 12/12 OU    Pen Light Exam (PLE)  External:  Flat OU  Lids/Lashes/Lacrimal Ducts: Flat OU    Sclera/Conjunctiva:  W+Q OU  Cornea: Cl OU  Anterior Chamber: D+F OU  Iris:  Flat OU  Lens:  Cl OU    Fundus Exam: dilated with 1% tropicamide and 2.5% phenylephrine  Approval obtained from primary team for dilation  Patient aware that pupils can remained dilated for at least 4-6 hours  Exam performed with 20D lens    Vitreous: wnl OU  Disc, cup/disc: sharp and pink, 0.4 OU  Macula:  wnl OU  Vessels:  wnl OU  Periphery: wnl OU    A&P  >> 43 yom no PMHx p/w +CMV on LP. Neg for retinal pathology on dilated exam  -Continue management per ID  -Pt informed to let team know if experiences change in vision, floaters, or flashes while admitted.    Follow-Up:  Patient should follow up his/her ophthalmologist or in the [facility] within 1 week of discharge.  [address] [place] [address].  [place] [address]  [phone]    S/D/W Dr Kumar (attending)

## 2018-12-20 NOTE — CHART NOTE - NSCHARTNOTEFT_GEN_A_CORE
MRI update:    Spoke to neuro-radiologist at ( [phone]) pt with no signs of acute stroke, brain edema, enhancements, or mass effect. MRI with no acute findings, pt cleared for discharge home      Balta Hall M.D  Dept of Internal Medicine  Pager- [place] - [phone]

## 2018-12-20 NOTE — PROGRESS NOTE ADULT - PROBLEM SELECTOR PLAN 2
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

## 2018-12-21 LAB
CMV DNA CSF QL NAA+PROBE: 119 — SIGNIFICANT CHANGE UP
CMV DNA SPEC NAA+PROBE-LOG#: 2.08 LOGIU/ML — HIGH

## 2019-01-15 PROBLEM — Z00.00 ENCOUNTER FOR PREVENTIVE HEALTH EXAMINATION: Status: ACTIVE | Noted: 2019-01-15

## 2019-02-06 LAB
CULTURE RESULTS: SIGNIFICANT CHANGE UP
SPECIMEN SOURCE: SIGNIFICANT CHANGE UP

## 2019-02-20 NOTE — DISCHARGE NOTE ADULT - MEDICATION SUMMARY - MEDICATIONS TO CHANGE
[facility] Emergency Department  [address] [facility] [address]  Phone:  [phone]  Fax:  [phone]                                    Rao Leavitt   MRN: [id number]    Department:  [facility] Emergency Department   Date of Visit:  2/20/2019           After Visit Summary Signature Page    I have received my discharge instructions, and my questions have been answered. I have discussed any challenges I see with this plan with the nurse or doctor.    ..........................................................................................................................................  Patient/Patient Representative Signature      ..........................................................................................................................................  Patient Representative Print Name and Relationship to Patient    ..................................................               ................................................  Date                                   Time    ..........................................................................................................................................  Reviewed by Signature/Title    ...................................................              ..............................................  Date                                               Time          22EPIC Rev 08/18        I will SWITCH the dose or number of times a day I take the medications listed below when I get home from the hospital:  None

## 2019-02-22 ENCOUNTER — APPOINTMENT (OUTPATIENT)
Dept: INFECTIOUS DISEASE | Facility: CLINIC | Age: 44
End: 2019-02-22
Payer: MEDICAID

## 2019-02-22 ENCOUNTER — LABORATORY RESULT (OUTPATIENT)
Age: 44
End: 2019-02-22

## 2019-02-22 VITALS
OXYGEN SATURATION: 98 % | TEMPERATURE: 97.1 F | WEIGHT: 232 LBS | SYSTOLIC BLOOD PRESSURE: 121 MMHG | BODY MASS INDEX: 29.77 KG/M2 | DIASTOLIC BLOOD PRESSURE: 82 MMHG | HEART RATE: 93 BPM | HEIGHT: 74 IN

## 2019-02-22 DIAGNOSIS — Z87.891 PERSONAL HISTORY OF NICOTINE DEPENDENCE: ICD-10-CM

## 2019-02-22 DIAGNOSIS — A87.9 VIRAL MENINGITIS, UNSPECIFIED: ICD-10-CM

## 2019-02-22 NOTE — CONSULT LETTER
[Dear  ___] : Dear  [unfilled], [Consult Letter:] : I had the pleasure of evaluating your patient, [unfilled]. [Please see my note below.] : Please see my note below. [Consult Closing:] : Thank you very much for allowing me to participate in the care of this patient.  If you have any questions, please do not hesitate to contact me. [Sincerely,] : Sincerely, [FreeTextEntry2] : Dr Bernard Aguila\par [facility] [place] [address]; [address]\[place]  [address] [FreeTextEntry3] : Marlon Garcia MD\par Infectious Diseases\par [facility]

## 2019-02-22 NOTE — ASSESSMENT
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 He tolerated Valganclovir treatment.\par \par Plan: CBC, CMV PCR

## 2019-02-22 NOTE — PHYSICAL EXAM
[General Appearance - Alert] : alert [General Appearance - In No Acute Distress] : in no acute distress [General Appearance - Well Nourished] : well nourished [Sclera] : the sclera and conjunctiva were normal [PERRL With Normal Accommodation] : pupils were equal in size, round, reactive to light [Extraocular Movements] : extraocular movements were intact [Outer Ear] : the ears and nose were normal in appearance [Oropharynx] : the oropharynx was normal with no thrush [Neck Appearance] : the appearance of the neck was normal [Neck Cervical Mass (___cm)] : no neck mass was observed [Jugular Venous Distention Increased] : there was no jugular-venous distention [Thyroid Diffuse Enlargement] : the thyroid was not enlarged [] : no respiratory distress [Auscultation Breath Sounds / Voice Sounds] : lungs were clear to auscultation bilaterally [Heart Rate And Rhythm] : heart rate was normal and rhythm regular [Heart Sounds] : normal S1 and S2 [Heart Sounds Gallop] : no gallops [Murmurs] : no murmurs [Heart Sounds Pericardial Friction Rub] : no pericardial rub [Edema] : there was no peripheral edema [No Palpable Adenopathy] : no palpable adenopathy [Musculoskeletal - Swelling] : no joint swelling [Nail Clubbing] : no clubbing  or cyanosis of the fingernails [Motor Tone] : muscle strength and tone were normal [Deep Tendon Reflexes (DTR)] : deep tendon reflexes were 2+ and symmetric [Sensation] : the sensory exam was normal to light touch and pinprick [No Focal Deficits] : no focal deficits [Oriented To Time, Place, And Person] : oriented to person, place, and time [Affect] : the affect was normal

## 2019-03-01 LAB
BASOPHILS # BLD AUTO: 0.04 K/UL
BASOPHILS NFR BLD AUTO: 0.5 %
EOSINOPHIL # BLD AUTO: 0.18 K/UL
EOSINOPHIL NFR BLD AUTO: 2.1 %
HCT VFR BLD CALC: 43.3 %
HGB BLD-MCNC: 14.3 G/DL
IMM GRANULOCYTES NFR BLD AUTO: 0.2 %
LYMPHOCYTES # BLD AUTO: 3.93 K/UL
LYMPHOCYTES NFR BLD AUTO: 44.8 %
MAN DIFF?: NORMAL
MCHC RBC-ENTMCNC: 29.1 PG
MCHC RBC-ENTMCNC: 33 GM/DL
MCV RBC AUTO: 88 FL
MONOCYTES # BLD AUTO: 1.02 K/UL
MONOCYTES NFR BLD AUTO: 11.6 %
NEUTROPHILS # BLD AUTO: 3.58 K/UL
NEUTROPHILS NFR BLD AUTO: 40.8 %
PLATELET # BLD AUTO: 323 K/UL
RBC # BLD: 4.92 M/UL
RBC # FLD: 14.5 %
WBC # FLD AUTO: 8.77 K/UL
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
## 2020-11-26 ENCOUNTER — TRANSCRIPTION ENCOUNTER (OUTPATIENT)
Age: 45
End: 2020-11-26

## 2021-01-19 NOTE — HISTORY OF PRESENT ILLNESS
PT Evaluation     Today's date: 2021  Patient name: Mariposa Quiroz  : 1976  MRN: [id number]  Referring provider: Maryann Wells MD  Dx:   Encounter Diagnosis     ICD-10-CM    1  Dissection of carotid artery (HCC)  I77 [id number] Ambulatory referral to Physical Therapy                  Assessment  Assessment details: Patient reports to physical therapy s/p carotid dissection and CVA  At this time patient has significantly reduced endurance, decreased balance, decreased VOR integration for balance and VOR insufficiency, and subsequent decreased ability to complete IADLs independently and participate in in her occupation  Pt will benefti from skilled therapy intervention 2x/week for 8 weeks in order to improve strength, endurance, VOR, balance, and return patient to prior level of function  Prognosis details: STG:  Pt will complete 6 mwt in 1200 ft or more within 4 weeks  Pt will improve FGA to 26/30 within 4 weeks  Pt will be participating in home program regularly wihtin 4 weeks  Pt will be able to tolerate vestibular exercise with minimal dizziness within 4 weeks    LTG  Pt will be able to return to yoga on a regular basis within 8 weeks  Pt will be able to go to grocery store and walk around with minimal symptoms wihtn 8 weeks    Plan  Planned therapy interventions: neuromuscular re-education, balance, strengthening, therapeutic exercise, therapeutic activities, home exercise program and gait training  Frequency: 2x week  Duration in weeks: 8  Plan of Care beginning date: 2021  Plan of Care expiration date: 2021  Treatment plan discussed with: patient        Subjective Evaluation    History of Present Illness  Mechanism of injury: Pt had a chiropractic adjustment on  and had a carotid dissection  She began not feeling well and then ended up in the hospital on   She was In the hospital off and on   She has had some adjustment coming home she needed to use a walker for ambulation  In the last two days she has been able to walk without use of walker  She is now able to walk up and down stairs standing and prior was sitting to get up and down stairs  She has her daughte rhome to help her when needed  She sits in a chair for a shower  Pt reports dizziness only when moving positions quickly  She continues to feel unsteady when walking long distances  Attempted to walk around the block and was significantly tired afterwards  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 , teaching every day, 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, worked as a massage therapist  Denies loss of sensation in [address]  Has assistance for IADLs     Pain  No pain reported    Social Support  Stairs in house: yes   Lives in: multiple-level home  Lives with: adult children and parents    Employment status: working  Treatments  Previous treatment: physical therapy  Discharged from (in last 30 days): inpatient hospitalization  Patient Goals  Patient goals for therapy: improved balance  Patient goal: improved endurance        Objective     Strength/Myotome Testing     Left Hip   Planes of Motion   Flexion: 4  Extension: 5  Abduction: 4  External rotation: 4    Right Hip   Planes of Motion   Flexion: 4  Extension: 5  Abduction: 4  External rotation: 4    Left Knee   Flexion: 4  Extension: 4    Right Knee   Flexion: 4  Extension: 4    Left Ankle/Foot   Dorsiflexion: 5  Plantar flexion: 5    Right Ankle/Foot   Dorsiflexion: 5  Plantar flexion: 5  Neuro Exam:     Sensation   Light touch LE: left WNL and right WNL  Proprioception LE: left WNL and right WNL    Coordination   Heel to shin: left WNL and right WNL  Rapid alternating movements: LE impaired     Modified Avery   Left gastroc: 0  Right gastroc: 0    Functional outcomes   6 minute walk test: 975ft no ad    FGA - 21/30         Precautions: Carotid occlusion      Manuals                                                                 Neuro Re-Ed Ther Ex                                                                                                                     Ther Activity                                       Gait Training                                       Modalities [FreeTextEntry1] : Doing fine now.\par \par Mr Fairchild was hospitalized at [facility] 12/14 --> 12/20/18 after marked fever, malaise and headache. He felt seriously ill. His spinal fluid was consistent with aseptic meningitis. At the time of his 2nd LP, CSF revealed CMV. He had +CMV Ig G, Neg CMV IgM and low level CMV viremia: 12/18: 224 copies; 12/20 119 copies/mL  Work up was otherwise negative: \par He tested negative for HIV serology and viral load, CD4 was above normal = 1683 - 46%. He has negative tests for Quantiferon Gold TB; ANDRY, anti dsDNA; RVP, Hep C; Leptospirosis; LDH was normal = 184. CSF cultures for bacteria, fungus and AFB were negative.\par Valganciclovir was initiated on 12/18/18 and continued for 3 week course after discharge.\par \par Mr Newman had gradually improvement and has felt fine since  late January. Previously he had malaise, headache, slow speech with some word finding problems. He found it difficult to drive. He is doing fine at work but may be a little slower than before. He is less engaged with the challenging personalities at work. His wife is well and there are no other illness in his contacts.\par \par He developed respiratory illness and was treated with an antibiotic. \par \par From Hospital Documents:\par HPI: 43M with no significant PMHx who presents with fevers and headaches of 2 week duration. Pt. has been having persistent fevers at home with Tmax of 102F associated with headache and neck stiffness. Headache is bilateral, and has been constant over the past two weeks, minimal relief with tylenol. He went to [facility] on 12/4 with these complaints, CTH negative, was recommended to get LP, however refused and went home. He continued to have fatigue and headaches, so was suggested by his PMD to come to the ED today. He denies hx of latent tb, cp, sob, n/v/d, dysuria, rashes, sick contacts, recent travel.  [place] moved [place] from [facility] at 23 yo and visits frequently last this summer.  No h/o drug use/senior living.  No h/o frequent infections/pna/past meningitis.  Pt states 3 yrs ago he had an "illness" and lost his sense of taste and smell - both slowly returning but things will smell different than they used to, has had neg work up by MD.\par Hospital course: pt is admitted for viral meninigitis. s/p LP in the ED on 12/14 , CSF PCR was negative, and CSF cell count and chemistries at the time was more consistent with bacterial meningitis and pt was started on broad spectrum abx. Repeated LP on 12/17, CSF PCR was positive for CMV, CSF cell count and chemistries were consistent with viral meningitis. CSF T-cell count was wnl. HIV and HepC are negative. Pt was started on Valganciclovir on 12/18. Pt remained afebrile and hemodynamically stable throughout hospital course. He is clinically improved with resolution of the photophobia and neck stiffness, will be discharged with f/u to ID. To take 21 day course of valganciclovir. MRI read pending. Patient seen by ophtho in patient to r/o CMV retinitis. 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

## 2021-01-23 ENCOUNTER — TRANSCRIPTION ENCOUNTER (OUTPATIENT)
Age: 46
End: 2021-01-23

## 2021-12-29 ENCOUNTER — TRANSCRIPTION ENCOUNTER (OUTPATIENT)
Age: 46
End: 2021-12-29

## 2022-12-25 ENCOUNTER — TRANSCRIPTION ENCOUNTER (OUTPATIENT)
Age: 47
End: 2022-12-25
